# Patient Record
Sex: MALE | Race: OTHER | HISPANIC OR LATINO | Employment: OTHER | URBAN - METROPOLITAN AREA
[De-identification: names, ages, dates, MRNs, and addresses within clinical notes are randomized per-mention and may not be internally consistent; named-entity substitution may affect disease eponyms.]

---

## 2017-01-24 ENCOUNTER — HOSPITAL ENCOUNTER (EMERGENCY)
Facility: HOSPITAL | Age: 27
Discharge: HOME | End: 2017-01-24
Attending: EMERGENCY MEDICINE | Admitting: EMERGENCY MEDICINE
Payer: COMMERCIAL

## 2017-01-24 VITALS
RESPIRATION RATE: 20 BRPM | DIASTOLIC BLOOD PRESSURE: 85 MMHG | BODY MASS INDEX: 21.85 KG/M2 | OXYGEN SATURATION: 100 % | TEMPERATURE: 96.6 F | HEART RATE: 94 BPM | WEIGHT: 128 LBS | HEIGHT: 64 IN | SYSTOLIC BLOOD PRESSURE: 142 MMHG

## 2017-01-24 DIAGNOSIS — S30.21XA: Primary | ICD-10-CM

## 2017-01-24 PROCEDURE — 99283 EMERGENCY DEPT VISIT LOW MDM: CPT

## 2017-01-24 PROCEDURE — 87591 N.GONORRHOEAE DNA AMP PROB: CPT | Performed by: EMERGENCY MEDICINE

## 2017-01-24 PROCEDURE — 87491 CHLMYD TRACH DNA AMP PROBE: CPT | Performed by: EMERGENCY MEDICINE

## 2017-01-25 LAB
CHLAMYDIA DNA CVX QL NAA+PROBE: NORMAL
N GONORRHOEA DNA GENITAL QL NAA+PROBE: NORMAL

## 2017-04-02 ENCOUNTER — HOSPITAL ENCOUNTER (INPATIENT)
Facility: HOSPITAL | Age: 27
LOS: 1 days | Discharge: HOME | DRG: 812 | End: 2017-04-04
Attending: EMERGENCY MEDICINE | Admitting: INTERNAL MEDICINE
Payer: COMMERCIAL

## 2017-04-02 DIAGNOSIS — T50.901A OVERDOSE: Primary | ICD-10-CM

## 2017-04-02 PROCEDURE — 96374 THER/PROPH/DIAG INJ IV PUSH: CPT

## 2017-04-02 PROCEDURE — 93005 ELECTROCARDIOGRAM TRACING: CPT | Performed by: EMERGENCY MEDICINE

## 2017-04-02 RX ORDER — MIDAZOLAM HYDROCHLORIDE 1 MG/ML
5 INJECTION INTRAMUSCULAR; INTRAVENOUS ONCE
Status: COMPLETED | OUTPATIENT
Start: 2017-04-03 | End: 2017-04-02

## 2017-04-02 RX ORDER — MIDAZOLAM HYDROCHLORIDE 1 MG/ML
INJECTION INTRAMUSCULAR; INTRAVENOUS
Status: COMPLETED
Start: 2017-04-02 | End: 2017-04-02

## 2017-04-02 RX ADMIN — MIDAZOLAM HYDROCHLORIDE 5 MG: 1 INJECTION INTRAMUSCULAR; INTRAVENOUS at 23:56

## 2017-04-02 RX ADMIN — MIDAZOLAM 5 MG: 1 INJECTION INTRAMUSCULAR; INTRAVENOUS at 23:56

## 2017-04-03 ENCOUNTER — APPOINTMENT (EMERGENCY)
Dept: CT IMAGING | Facility: HOSPITAL | Age: 27
DRG: 812 | End: 2017-04-03
Payer: COMMERCIAL

## 2017-04-03 PROBLEM — T14.91XA SUICIDAL BEHAVIOR WITH ATTEMPTED SELF-INJURY (HCC): Status: ACTIVE | Noted: 2017-04-03

## 2017-04-03 PROBLEM — F10.929 ALCOHOL INTOXICATION (HCC): Status: ACTIVE | Noted: 2017-04-03

## 2017-04-03 PROBLEM — T50.901A OVERDOSE: Status: ACTIVE | Noted: 2017-04-03

## 2017-04-03 LAB
ALBUMIN SERPL BCP-MCNC: 3.3 G/DL (ref 3.5–5)
ALBUMIN SERPL BCP-MCNC: 4 G/DL (ref 3.5–5)
ALP SERPL-CCNC: 53 U/L (ref 46–116)
ALP SERPL-CCNC: 65 U/L (ref 46–116)
ALT SERPL W P-5'-P-CCNC: 35 U/L (ref 12–78)
ALT SERPL W P-5'-P-CCNC: 41 U/L (ref 12–78)
AMMONIA PLAS-SCNC: 25 UMOL/L (ref 11–35)
AMPHETAMINES SERPL QL SCN: NEGATIVE
ANION GAP SERPL CALCULATED.3IONS-SCNC: 11 MMOL/L (ref 4–13)
ANION GAP SERPL CALCULATED.3IONS-SCNC: 11 MMOL/L (ref 4–13)
APAP SERPL-MCNC: <2 UG/ML (ref 10–30)
APTT PPP: 28 SECONDS (ref 24–36)
AST SERPL W P-5'-P-CCNC: 31 U/L (ref 5–45)
AST SERPL W P-5'-P-CCNC: 40 U/L (ref 5–45)
ATRIAL RATE: 97 BPM
BARBITURATES UR QL: NEGATIVE
BASOPHILS # BLD MANUAL: 0 THOUSAND/UL (ref 0–0.1)
BASOPHILS NFR MAR MANUAL: 0 % (ref 0–1)
BENZODIAZ UR QL: NEGATIVE
BILIRUB SERPL-MCNC: 0.3 MG/DL (ref 0.2–1)
BILIRUB SERPL-MCNC: 0.4 MG/DL (ref 0.2–1)
BUN SERPL-MCNC: 7 MG/DL (ref 5–25)
BUN SERPL-MCNC: 7 MG/DL (ref 5–25)
CALCIUM SERPL-MCNC: 7.4 MG/DL (ref 8.3–10.1)
CALCIUM SERPL-MCNC: 8.4 MG/DL (ref 8.3–10.1)
CHLORIDE SERPL-SCNC: 107 MMOL/L (ref 100–108)
CHLORIDE SERPL-SCNC: 99 MMOL/L (ref 100–108)
CO2 SERPL-SCNC: 24 MMOL/L (ref 21–32)
CO2 SERPL-SCNC: 27 MMOL/L (ref 21–32)
COCAINE UR QL: NEGATIVE
CREAT SERPL-MCNC: 0.7 MG/DL (ref 0.6–1.3)
CREAT SERPL-MCNC: 0.7 MG/DL (ref 0.6–1.3)
EOSINOPHIL # BLD MANUAL: 0.17 THOUSAND/UL (ref 0–0.4)
EOSINOPHIL NFR BLD MANUAL: 3 % (ref 0–6)
ERYTHROCYTE [DISTWIDTH] IN BLOOD BY AUTOMATED COUNT: 11.5 % (ref 11.6–15.1)
ETHANOL SERPL-MCNC: 291 MG/DL (ref 0–3)
GFR SERPL CREATININE-BSD FRML MDRD: >60 ML/MIN/1.73SQ M
GFR SERPL CREATININE-BSD FRML MDRD: >60 ML/MIN/1.73SQ M
GLUCOSE SERPL-MCNC: 101 MG/DL (ref 65–140)
GLUCOSE SERPL-MCNC: 101 MG/DL (ref 65–140)
HCT VFR BLD AUTO: 42.3 % (ref 36.5–49.3)
HGB BLD-MCNC: 14.8 G/DL (ref 12–17)
INR PPP: 0.83 (ref 0.86–1.16)
LYMPHOCYTES # BLD AUTO: 1.86 THOUSAND/UL (ref 0.6–4.47)
LYMPHOCYTES # BLD AUTO: 33 % (ref 14–44)
MAGNESIUM SERPL-MCNC: 2 MG/DL (ref 1.6–2.6)
MCH RBC QN AUTO: 33 PG (ref 26.8–34.3)
MCHC RBC AUTO-ENTMCNC: 35 G/DL (ref 31.4–37.4)
MCV RBC AUTO: 94 FL (ref 82–98)
METHADONE UR QL: NEGATIVE
MONOCYTES # BLD AUTO: 0.62 THOUSAND/UL (ref 0–1.22)
MONOCYTES NFR BLD: 11 % (ref 4–12)
NEUTROPHILS # BLD MANUAL: 2.87 THOUSAND/UL (ref 1.85–7.62)
NEUTS SEG NFR BLD AUTO: 51 % (ref 43–75)
NRBC BLD AUTO-RTO: 1 /100 WBC (ref 0–2)
OPIATES UR QL SCN: NEGATIVE
P AXIS: 18 DEGREES
PCP UR QL: NEGATIVE
PLATELET # BLD AUTO: 209 THOUSANDS/UL (ref 149–390)
PLATELET # BLD AUTO: 237 THOUSANDS/UL (ref 149–390)
PLATELET BLD QL SMEAR: ADEQUATE
PMV BLD AUTO: 9.2 FL (ref 8.9–12.7)
PMV BLD AUTO: 9.3 FL (ref 8.9–12.7)
POTASSIUM SERPL-SCNC: 3.7 MMOL/L (ref 3.5–5.3)
POTASSIUM SERPL-SCNC: 4.5 MMOL/L (ref 3.5–5.3)
PR INTERVAL: 138 MS
PROT SERPL-MCNC: 6.8 G/DL (ref 6.4–8.2)
PROT SERPL-MCNC: 8.1 G/DL (ref 6.4–8.2)
PROTHROMBIN TIME: 11.4 SECONDS (ref 12–14.3)
QRS AXIS: 58 DEGREES
QRSD INTERVAL: 94 MS
QT INTERVAL: 340 MS
QTC INTERVAL: 431 MS
RBC # BLD AUTO: 4.48 MILLION/UL (ref 3.88–5.62)
SALICYLATES SERPL-MCNC: <3 MG/DL (ref 3–20)
SODIUM SERPL-SCNC: 137 MMOL/L (ref 136–145)
SODIUM SERPL-SCNC: 142 MMOL/L (ref 136–145)
T WAVE AXIS: 33 DEGREES
THC UR QL: NEGATIVE
TOTAL CELLS COUNTED SPEC: 100
VARIANT LYMPHS # BLD AUTO: 2 %
VENTRICULAR RATE: 97 BPM
WBC # BLD AUTO: 5.63 THOUSAND/UL (ref 4.31–10.16)

## 2017-04-03 PROCEDURE — 82140 ASSAY OF AMMONIA: CPT | Performed by: EMERGENCY MEDICINE

## 2017-04-03 PROCEDURE — 80329 ANALGESICS NON-OPIOID 1 OR 2: CPT | Performed by: EMERGENCY MEDICINE

## 2017-04-03 PROCEDURE — 80307 DRUG TEST PRSMV CHEM ANLYZR: CPT | Performed by: EMERGENCY MEDICINE

## 2017-04-03 PROCEDURE — 83735 ASSAY OF MAGNESIUM: CPT | Performed by: NURSE PRACTITIONER

## 2017-04-03 PROCEDURE — 85027 COMPLETE CBC AUTOMATED: CPT | Performed by: EMERGENCY MEDICINE

## 2017-04-03 PROCEDURE — 70450 CT HEAD/BRAIN W/O DYE: CPT

## 2017-04-03 PROCEDURE — 85049 AUTOMATED PLATELET COUNT: CPT | Performed by: NURSE PRACTITIONER

## 2017-04-03 PROCEDURE — 85730 THROMBOPLASTIN TIME PARTIAL: CPT | Performed by: EMERGENCY MEDICINE

## 2017-04-03 PROCEDURE — 99285 EMERGENCY DEPT VISIT HI MDM: CPT

## 2017-04-03 PROCEDURE — 96375 TX/PRO/DX INJ NEW DRUG ADDON: CPT

## 2017-04-03 PROCEDURE — 80053 COMPREHEN METABOLIC PANEL: CPT | Performed by: EMERGENCY MEDICINE

## 2017-04-03 PROCEDURE — 36415 COLL VENOUS BLD VENIPUNCTURE: CPT | Performed by: EMERGENCY MEDICINE

## 2017-04-03 PROCEDURE — 80053 COMPREHEN METABOLIC PANEL: CPT | Performed by: NURSE PRACTITIONER

## 2017-04-03 PROCEDURE — 93005 ELECTROCARDIOGRAM TRACING: CPT | Performed by: NURSE PRACTITIONER

## 2017-04-03 PROCEDURE — 85610 PROTHROMBIN TIME: CPT | Performed by: EMERGENCY MEDICINE

## 2017-04-03 PROCEDURE — 85007 BL SMEAR W/DIFF WBC COUNT: CPT | Performed by: EMERGENCY MEDICINE

## 2017-04-03 PROCEDURE — 80320 DRUG SCREEN QUANTALCOHOLS: CPT | Performed by: EMERGENCY MEDICINE

## 2017-04-03 RX ORDER — MIDAZOLAM HYDROCHLORIDE 1 MG/ML
5 INJECTION INTRAMUSCULAR; INTRAVENOUS ONCE
Status: COMPLETED | OUTPATIENT
Start: 2017-04-03 | End: 2017-04-03

## 2017-04-03 RX ORDER — MIDAZOLAM HYDROCHLORIDE 1 MG/ML
INJECTION INTRAMUSCULAR; INTRAVENOUS
Status: COMPLETED
Start: 2017-04-03 | End: 2017-04-03

## 2017-04-03 RX ORDER — LORAZEPAM 2 MG/ML
2 INJECTION INTRAMUSCULAR EVERY 6 HOURS PRN
Status: DISCONTINUED | OUTPATIENT
Start: 2017-04-03 | End: 2017-04-04 | Stop reason: HOSPADM

## 2017-04-03 RX ORDER — LORAZEPAM 2 MG/ML
2 INJECTION INTRAMUSCULAR ONCE
Status: DISCONTINUED | OUTPATIENT
Start: 2017-04-03 | End: 2017-04-03

## 2017-04-03 RX ORDER — LORAZEPAM 2 MG/ML
INJECTION INTRAMUSCULAR
Status: COMPLETED
Start: 2017-04-03 | End: 2017-04-03

## 2017-04-03 RX ORDER — LORAZEPAM 2 MG/ML
1 INJECTION INTRAMUSCULAR ONCE
Status: COMPLETED | OUTPATIENT
Start: 2017-04-03 | End: 2017-04-03

## 2017-04-03 RX ORDER — FOLIC ACID 1 MG/1
1 TABLET ORAL DAILY
Status: DISCONTINUED | OUTPATIENT
Start: 2017-04-04 | End: 2017-04-04 | Stop reason: HOSPADM

## 2017-04-03 RX ORDER — SODIUM CHLORIDE 9 MG/ML
125 INJECTION, SOLUTION INTRAVENOUS CONTINUOUS
Status: DISCONTINUED | OUTPATIENT
Start: 2017-04-03 | End: 2017-04-04

## 2017-04-03 RX ORDER — CHLORDIAZEPOXIDE HYDROCHLORIDE 25 MG/1
25 CAPSULE, GELATIN COATED ORAL EVERY 8 HOURS SCHEDULED
Status: DISCONTINUED | OUTPATIENT
Start: 2017-04-03 | End: 2017-04-04 | Stop reason: HOSPADM

## 2017-04-03 RX ORDER — KETOROLAC TROMETHAMINE 30 MG/ML
15 INJECTION, SOLUTION INTRAMUSCULAR; INTRAVENOUS EVERY 6 HOURS PRN
Status: DISCONTINUED | OUTPATIENT
Start: 2017-04-03 | End: 2017-04-04 | Stop reason: HOSPADM

## 2017-04-03 RX ORDER — THIAMINE MONONITRATE (VIT B1) 100 MG
100 TABLET ORAL DAILY
Status: DISCONTINUED | OUTPATIENT
Start: 2017-04-04 | End: 2017-04-04 | Stop reason: HOSPADM

## 2017-04-03 RX ORDER — ONDANSETRON 2 MG/ML
4 INJECTION INTRAMUSCULAR; INTRAVENOUS EVERY 6 HOURS PRN
Status: DISCONTINUED | OUTPATIENT
Start: 2017-04-03 | End: 2017-04-04 | Stop reason: HOSPADM

## 2017-04-03 RX ADMIN — CHLORDIAZEPOXIDE HYDROCHLORIDE 25 MG: 25 CAPSULE ORAL at 22:20

## 2017-04-03 RX ADMIN — MIDAZOLAM HYDROCHLORIDE 5 MG: 1 INJECTION INTRAMUSCULAR; INTRAVENOUS at 02:59

## 2017-04-03 RX ADMIN — METOPROLOL TARTRATE 5 MG: 5 INJECTION INTRAVENOUS at 14:25

## 2017-04-03 RX ADMIN — LORAZEPAM 1 MG: 2 INJECTION INTRAMUSCULAR at 00:37

## 2017-04-03 RX ADMIN — SODIUM CHLORIDE 2000 ML: 0.9 INJECTION, SOLUTION INTRAVENOUS at 03:00

## 2017-04-03 RX ADMIN — METOPROLOL TARTRATE 5 MG: 5 INJECTION INTRAVENOUS at 03:00

## 2017-04-03 RX ADMIN — SODIUM CHLORIDE 125 ML/HR: 0.9 INJECTION, SOLUTION INTRAVENOUS at 23:39

## 2017-04-03 RX ADMIN — SODIUM CHLORIDE 125 ML/HR: 0.9 INJECTION, SOLUTION INTRAVENOUS at 08:00

## 2017-04-03 RX ADMIN — ENOXAPARIN SODIUM 40 MG: 40 INJECTION SUBCUTANEOUS at 09:46

## 2017-04-03 RX ADMIN — SODIUM CHLORIDE 125 ML/HR: 0.9 INJECTION, SOLUTION INTRAVENOUS at 15:51

## 2017-04-03 RX ADMIN — SODIUM CHLORIDE 125 ML/HR: 0.9 INJECTION, SOLUTION INTRAVENOUS at 05:00

## 2017-04-03 RX ADMIN — MIDAZOLAM 5 MG: 1 INJECTION INTRAMUSCULAR; INTRAVENOUS at 02:59

## 2017-04-03 RX ADMIN — CHLORDIAZEPOXIDE HYDROCHLORIDE 25 MG: 25 CAPSULE ORAL at 13:25

## 2017-04-03 RX ADMIN — METOPROLOL TARTRATE 5 MG: 5 INJECTION INTRAVENOUS at 20:41

## 2017-04-03 RX ADMIN — LORAZEPAM 2 MG: 2 INJECTION, SOLUTION INTRAMUSCULAR; INTRAVENOUS at 12:53

## 2017-04-03 RX ADMIN — LORAZEPAM 1 MG: 2 INJECTION, SOLUTION INTRAMUSCULAR; INTRAVENOUS at 00:37

## 2017-04-04 PROBLEM — T50.901A OVERDOSE: Status: RESOLVED | Noted: 2017-04-03 | Resolved: 2017-04-04

## 2017-04-04 PROBLEM — T14.91XA SUICIDAL BEHAVIOR WITH ATTEMPTED SELF-INJURY (HCC): Status: RESOLVED | Noted: 2017-04-03 | Resolved: 2017-04-04

## 2017-04-04 LAB
ALBUMIN SERPL BCP-MCNC: 3.2 G/DL (ref 3.5–5)
ALP SERPL-CCNC: 53 U/L (ref 46–116)
ALT SERPL W P-5'-P-CCNC: 31 U/L (ref 12–78)
ANION GAP SERPL CALCULATED.3IONS-SCNC: 8 MMOL/L (ref 4–13)
AST SERPL W P-5'-P-CCNC: 25 U/L (ref 5–45)
BASOPHILS # BLD AUTO: 0.01 THOUSANDS/ΜL (ref 0–0.1)
BASOPHILS NFR BLD AUTO: 0 % (ref 0–1)
BILIRUB SERPL-MCNC: 0.5 MG/DL (ref 0.2–1)
BUN SERPL-MCNC: 6 MG/DL (ref 5–25)
CALCIUM SERPL-MCNC: 8.6 MG/DL (ref 8.3–10.1)
CHLORIDE SERPL-SCNC: 106 MMOL/L (ref 100–108)
CO2 SERPL-SCNC: 25 MMOL/L (ref 21–32)
CREAT SERPL-MCNC: 0.67 MG/DL (ref 0.6–1.3)
EOSINOPHIL # BLD AUTO: 0.1 THOUSAND/ΜL (ref 0–0.61)
EOSINOPHIL NFR BLD AUTO: 2 % (ref 0–6)
ERYTHROCYTE [DISTWIDTH] IN BLOOD BY AUTOMATED COUNT: 11.7 % (ref 11.6–15.1)
GFR SERPL CREATININE-BSD FRML MDRD: >60 ML/MIN/1.73SQ M
GLUCOSE SERPL-MCNC: 110 MG/DL (ref 65–140)
HCT VFR BLD AUTO: 37.7 % (ref 36.5–49.3)
HGB BLD-MCNC: 13.1 G/DL (ref 12–17)
LYMPHOCYTES # BLD AUTO: 1.19 THOUSANDS/ΜL (ref 0.6–4.47)
LYMPHOCYTES NFR BLD AUTO: 22 % (ref 14–44)
MAGNESIUM SERPL-MCNC: 1.9 MG/DL (ref 1.6–2.6)
MCH RBC QN AUTO: 33.3 PG (ref 26.8–34.3)
MCHC RBC AUTO-ENTMCNC: 34.7 G/DL (ref 31.4–37.4)
MCV RBC AUTO: 96 FL (ref 82–98)
MONOCYTES # BLD AUTO: 0.55 THOUSAND/ΜL (ref 0.17–1.22)
MONOCYTES NFR BLD AUTO: 10 % (ref 4–12)
NEUTROPHILS # BLD AUTO: 3.46 THOUSANDS/ΜL (ref 1.85–7.62)
NEUTS SEG NFR BLD AUTO: 66 % (ref 43–75)
PLATELET # BLD AUTO: 193 THOUSANDS/UL (ref 149–390)
PMV BLD AUTO: 9.4 FL (ref 8.9–12.7)
POTASSIUM SERPL-SCNC: 3.9 MMOL/L (ref 3.5–5.3)
PROT SERPL-MCNC: 6.8 G/DL (ref 6.4–8.2)
RBC # BLD AUTO: 3.93 MILLION/UL (ref 3.88–5.62)
SODIUM SERPL-SCNC: 139 MMOL/L (ref 136–145)
WBC # BLD AUTO: 5.31 THOUSAND/UL (ref 4.31–10.16)

## 2017-04-04 PROCEDURE — 80053 COMPREHEN METABOLIC PANEL: CPT | Performed by: INTERNAL MEDICINE

## 2017-04-04 PROCEDURE — 85025 COMPLETE CBC W/AUTO DIFF WBC: CPT | Performed by: INTERNAL MEDICINE

## 2017-04-04 PROCEDURE — 83735 ASSAY OF MAGNESIUM: CPT | Performed by: INTERNAL MEDICINE

## 2017-04-04 RX ORDER — LANOLIN ALCOHOL/MO/W.PET/CERES
50 CREAM (GRAM) TOPICAL DAILY
Qty: 15 TABLET | Refills: 0 | Status: SHIPPED | OUTPATIENT
Start: 2017-04-04 | End: 2022-05-24

## 2017-04-04 RX ORDER — FOLIC ACID 1 MG/1
1 TABLET ORAL DAILY
Qty: 30 TABLET | Refills: 0 | Status: SHIPPED | OUTPATIENT
Start: 2017-04-04 | End: 2022-05-24

## 2017-04-04 RX ORDER — CHLORDIAZEPOXIDE HYDROCHLORIDE 10 MG/1
10 CAPSULE, GELATIN COATED ORAL 2 TIMES DAILY
Qty: 20 CAPSULE | Refills: 0
Start: 2017-04-04 | End: 2022-05-24

## 2017-04-04 RX ADMIN — CHLORDIAZEPOXIDE HYDROCHLORIDE 25 MG: 25 CAPSULE ORAL at 05:35

## 2017-04-04 RX ADMIN — Medication 100 MG: at 08:35

## 2017-04-04 RX ADMIN — FOLIC ACID 1 MG: 1 TABLET ORAL at 08:35

## 2017-04-04 RX ADMIN — MULTIPLE VITAMINS W/ MINERALS TAB 1 TABLET: TAB at 08:35

## 2017-04-04 RX ADMIN — SODIUM CHLORIDE 125 ML/HR: 0.9 INJECTION, SOLUTION INTRAVENOUS at 07:10

## 2017-04-04 RX ADMIN — ENOXAPARIN SODIUM 40 MG: 40 INJECTION SUBCUTANEOUS at 08:35

## 2017-04-07 LAB
ATRIAL RATE: 119 BPM
P AXIS: 61 DEGREES
PR INTERVAL: 144 MS
QRS AXIS: 56 DEGREES
QRSD INTERVAL: 88 MS
QT INTERVAL: 308 MS
QTC INTERVAL: 433 MS
T WAVE AXIS: 38 DEGREES
VENTRICULAR RATE: 119 BPM

## 2017-04-09 VITALS
HEART RATE: 119 BPM | BODY MASS INDEX: 22.18 KG/M2 | DIASTOLIC BLOOD PRESSURE: 80 MMHG | HEIGHT: 66 IN | WEIGHT: 138.01 LBS | SYSTOLIC BLOOD PRESSURE: 133 MMHG | OXYGEN SATURATION: 99 % | TEMPERATURE: 97.5 F | RESPIRATION RATE: 16 BRPM

## 2017-09-27 ENCOUNTER — TRANSCRIBE ORDERS (OUTPATIENT)
Dept: LAB | Facility: HOSPITAL | Age: 27
End: 2017-09-27

## 2017-09-27 ENCOUNTER — APPOINTMENT (OUTPATIENT)
Dept: LAB | Facility: HOSPITAL | Age: 27
End: 2017-09-27
Payer: COMMERCIAL

## 2017-09-27 DIAGNOSIS — Z31.41 VISIT FOR SEMEN ANALYSIS: Primary | ICD-10-CM

## 2017-09-27 DIAGNOSIS — Z31.41 VISIT FOR SEMEN ANALYSIS: ICD-10-CM

## 2017-09-27 PROCEDURE — 89320 SEMEN ANAL VOL/COUNT/MOT: CPT

## 2017-09-29 LAB
B ABORTUS AB SMN QL AGGL: ABNORMAL
COLLECTION DATE & TIME: ABNORMAL
FRUCTOSE [MASS/VOLUME] IN SEMEN: ABNORMAL MG/DL
LIQUEFACTION TIME SMN: 10 MIN
PH SMN: 8.1 [PH] (ref 7.2–8.6)
SEX ABSTIN DURATION TIME PATIENT: ABNORMAL D
SPECIMEN VOL SMN: 0.8 ML (ref 1–5)
SPERM # SMN: 7.2 MILLION/EJACULATION (ref 40–1000)
SPERM AMORPHOUS HEAD NFR SMN: 18 %
SPERM MORPH PNL SMN: 8
SPERM MOTILE SMN QL MICRO: 55 %
SPERM MOTILITY SCORE SMN AUTO: 3 (ref 2–4)
SPERM PROG NFR SMN: 3 % (ref 2–4)
SPERM SMN: 0 %
SPERM SMN: 10 %
SPERM SMN: 2 %
SPERM SMN: 26 % (ref 50–100)
SPERM SMN: 32 %
SPERM SMN: 5 %
SPERM SMN: 7 %
SPERM SMN: 74 % (ref 0–50)
SPERM SMN: 9 /ML (ref 20–999)
VISC SMN: 3 CP (ref 3–4)
WBC SMN QL: 3 "HPF"

## 2020-11-25 ENCOUNTER — OCCMED (OUTPATIENT)
Dept: URGENT CARE | Facility: CLINIC | Age: 30
End: 2020-11-25
Payer: OTHER MISCELLANEOUS

## 2020-11-25 ENCOUNTER — APPOINTMENT (OUTPATIENT)
Dept: RADIOLOGY | Facility: CLINIC | Age: 30
End: 2020-11-25
Payer: OTHER MISCELLANEOUS

## 2020-11-25 DIAGNOSIS — R52 PAIN: Primary | ICD-10-CM

## 2020-11-25 DIAGNOSIS — R52 PAIN: ICD-10-CM

## 2020-11-25 PROCEDURE — 99283 EMERGENCY DEPT VISIT LOW MDM: CPT

## 2020-11-25 PROCEDURE — G0382 LEV 3 HOSP TYPE B ED VISIT: HCPCS

## 2020-11-25 PROCEDURE — 73630 X-RAY EXAM OF FOOT: CPT

## 2020-11-25 PROCEDURE — 73610 X-RAY EXAM OF ANKLE: CPT

## 2020-11-27 ENCOUNTER — OCCMED (OUTPATIENT)
Dept: URGENT CARE | Facility: CLINIC | Age: 30
End: 2020-11-27
Payer: OTHER MISCELLANEOUS

## 2020-11-27 PROCEDURE — 99213 OFFICE O/P EST LOW 20 MIN: CPT | Performed by: FAMILY MEDICINE

## 2020-12-11 ENCOUNTER — APPOINTMENT (OUTPATIENT)
Dept: URGENT CARE | Facility: CLINIC | Age: 30
End: 2020-12-11
Payer: OTHER MISCELLANEOUS

## 2020-12-11 PROCEDURE — 99213 OFFICE O/P EST LOW 20 MIN: CPT | Performed by: FAMILY MEDICINE

## 2021-07-30 ENCOUNTER — APPOINTMENT (OUTPATIENT)
Dept: RADIOLOGY | Facility: CLINIC | Age: 31
End: 2021-07-30
Payer: COMMERCIAL

## 2021-07-30 ENCOUNTER — OFFICE VISIT (OUTPATIENT)
Dept: URGENT CARE | Facility: CLINIC | Age: 31
End: 2021-07-30
Payer: COMMERCIAL

## 2021-07-30 VITALS
TEMPERATURE: 98 F | BODY MASS INDEX: 24.27 KG/M2 | OXYGEN SATURATION: 98 % | SYSTOLIC BLOOD PRESSURE: 122 MMHG | WEIGHT: 137 LBS | DIASTOLIC BLOOD PRESSURE: 74 MMHG | HEIGHT: 63 IN | RESPIRATION RATE: 18 BRPM | HEART RATE: 96 BPM

## 2021-07-30 DIAGNOSIS — S99.912A LEFT ANKLE INJURY, INITIAL ENCOUNTER: ICD-10-CM

## 2021-07-30 DIAGNOSIS — S93.492A SPRAIN OF ANTERIOR TALOFIBULAR LIGAMENT OF LEFT ANKLE, INITIAL ENCOUNTER: Primary | ICD-10-CM

## 2021-07-30 PROCEDURE — S9083 URGENT CARE CENTER GLOBAL: HCPCS | Performed by: FAMILY MEDICINE

## 2021-07-30 PROCEDURE — 73610 X-RAY EXAM OF ANKLE: CPT

## 2021-07-30 PROCEDURE — G0382 LEV 3 HOSP TYPE B ED VISIT: HCPCS | Performed by: FAMILY MEDICINE

## 2021-07-30 RX ORDER — MELOXICAM 7.5 MG/1
7.5 TABLET ORAL DAILY
Qty: 60 TABLET | Refills: 0 | Status: SHIPPED | OUTPATIENT
Start: 2021-07-30 | End: 2022-05-24

## 2021-07-30 NOTE — PROGRESS NOTES
[address] [employer]        NAME: Morrell Libman is a 27 y o  male  : 1990    MRN: [id number]  DATE: 2021  TIME: 10:44 AM    Assessment and Plan   Sprain of anterior talofibular ligament of left ankle, initial encounter [S93 492A]  1  Sprain of anterior talofibular ligament of left ankle, initial encounter  meloxicam (MOBIC) 7 5 mg tablet    Ambulatory referral to Physical Therapy   2  Left ankle injury, initial encounter  XR ankle 3+ vw left    meloxicam (MOBIC) 7 5 mg tablet    Ambulatory referral to Physical Therapy         Patient Instructions       Follow up with PCP in 3-5 days  Proceed to  ER if symptoms worsen  Chief Complaint     Chief Complaint   Patient presents with    Ankle Injury     left  Twisted ankle 5 days ago while walking and talking on phone         History of Present Illness       21-year-old male reports dressing his left ankle 5 days ago while walking in his home  He is now having pain and swelling over the lateral aspect of the ankle  Review of Systems   Review of Systems   Constitutional: Negative  HENT: Negative  Eyes: Negative  Respiratory: Negative  Cardiovascular: Negative  Gastrointestinal: Negative  Genitourinary: Negative  Musculoskeletal: Positive for arthralgias and myalgias  Skin: Negative  Allergic/Immunologic: Negative  Neurological: Negative  Hematological: Negative  Psychiatric/Behavioral: Negative            Current Medications       Current Outpatient Medications:     chlordiazePOXIDE (LIBRIUM) 10 mg capsule, Take 1 capsule by mouth 2 (two) times a day for 10 days Take for 3 days and than 10 mg daily for 3 days and than stop, Disp: 20 capsule, Rfl: 0    folic acid (FOLVITE) 1 mg tablet, Take 1 tablet by mouth daily for 30 days, Disp: 30 tablet, Rfl: 0    meloxicam (MOBIC) 7 5 mg tablet, Take 1 tablet (7 5 mg total) by mouth daily, Disp: 60 tablet, Rfl: 0    thiamine 100 MG tablet, Take 0 5 tablets by mouth daily for 30 days, Disp: 15 tablet, Rfl: 0    Current Allergies     Allergies as of 07/30/2021    (No Known Allergies)            The following portions of the patient's history were reviewed and updated as appropriate: allergies, current medications, past family history, past medical history, past social history, past surgical history and problem list      No past medical history on file  No past surgical history on file  No family history on file  Medications have been verified  Objective   /74   Pulse 96   Temp 98 °F (36 7 °C)   Resp 18   Ht 5' 3" (1 6 m)   Wt 62 1 kg (137 lb)   SpO2 98%   BMI 24 27 kg/m²   No LMP for male patient  Physical Exam     Physical Exam  Constitutional:       Appearance: He is well-developed  HENT:      Head: Normocephalic  Eyes:      Pupils: Pupils are equal, round, and reactive to light  Pulmonary:      Effort: Pulmonary effort is normal    Musculoskeletal:      Cervical back: Normal range of motion  Left ankle: Swelling present  Tenderness present over the ATF ligament  Decreased range of motion  Skin:     General: Skin is warm  Neurological:      Mental Status: He is alert and oriented to person, place, and time

## 2021-08-12 ENCOUNTER — EVALUATION (OUTPATIENT)
Dept: PHYSICAL THERAPY | Facility: CLINIC | Age: 31
End: 2021-08-12
Payer: COMMERCIAL

## 2021-08-12 DIAGNOSIS — S99.912A LEFT ANKLE INJURY, INITIAL ENCOUNTER: ICD-10-CM

## 2021-08-12 DIAGNOSIS — S93.492A SPRAIN OF ANTERIOR TALOFIBULAR LIGAMENT OF LEFT ANKLE, INITIAL ENCOUNTER: ICD-10-CM

## 2021-08-12 PROCEDURE — 97161 PT EVAL LOW COMPLEX 20 MIN: CPT | Performed by: PHYSICAL THERAPIST

## 2021-08-12 NOTE — LETTER
2021    Michael Gonzales, [address] [facility]  [address] [facility] [place] [address]    Patient: Shannan Navarrete   YOB: 1990   Date of Visit: 2021     Encounter Diagnosis     ICD-10-CM    1  Left ankle injury, initial encounter  S99 912A Ambulatory referral to Physical Therapy     PT plan of care cert/re-cert   2  Sprain of anterior talofibular ligament of left ankle, initial encounter  S93 492A Ambulatory referral to Physical Therapy     PT plan of care cert/re-cert       Dear Dr Jackie Tamayo: Thank you for your recent referral of Shannan Navarrete  Please review the attached evaluation summary from Corbin's recent visit  Please verify that you agree with the plan of care by signing the attached order  If you have any questions or concerns, please do not hesitate to call  I sincerely appreciate the opportunity to share in the care of one of your patients and hope to have another opportunity to work with you in the near future  Sincerely,    Joy Elizabeth, PT      Referring Provider:      I certify that I have read the below Plan of Care and certify the need for these services furnished under this plan of treatment while under my care  DO BENNY Milner G  [address]  [address] [facility] [place] [address]  Via Fax: [phone]          PT Evaluation     Today's date: 2021  Patient name: Shannan Navarrete  : 1990  MRN: [id number]  Referring provider: Jeffery Kay DO  Dx:   Encounter Diagnosis     ICD-10-CM    1  Left ankle injury, initial encounter  25 [id number] Ambulatory referral to Physical Therapy   2  Sprain of anterior talofibular ligament of left ankle, initial encounter  S93 492A Ambulatory referral to Physical Therapy                  Assessment  Assessment details: Patient is a 27 y o  male with PT prescription for left ATFL sprain   Patient presents with decreased ankle dorsiflexion and plantarflexion ROM, decreased joint mobility  His strength, swelling, and ankle stability in single limb stance are symmetrical to his uninvolved side  Patient gets mild pain with prolonged standing and walking, but hasn't had functional limitation recently  Due to low levels of pain and few noted impairments, patient likely is appropriate for just home program versus formal physical therapy  Patient was instructed in home program to address deficits  Instructed patient to follow up with clinic if he starts to have significant pain again or has questions about home program  Patient is in agreement with this plan  Impairments: abnormal or restricted ROM, lacks appropriate home exercise program and weight-bearing intolerance    Symptom irritability: lowUnderstanding of Dx/Px/POC: good   Prognosis: good    Goals  Short term goals:  Patient is independent in home program to support plan of care and improve function  - 2 weeks  Patient demonstrates 0 degrees of ankle dorsiflexion PROM so he can climb stairs pain  - 2 weeks  Patient can walk in home without pain  - 2 weeks    Long term goals:  Patient demonstrates improvement in community participation with increase in FOTO score by 10%  - 4 weeks  Patient can return to work duties without pain  - 4 weeks  Patient demonstrates full dorsiflexion AROM so he can walk in community and do food shopping without pain   4 weeks      Plan  Patient would benefit from: skilled physical therapy  Planned therapy interventions: ADL training, balance, balance/weight bearing training, coordination, flexibility, functional ROM exercises, gait training, home exercise program, therapeutic exercise, therapeutic activities, strengthening, stretching, joint mobilization, manual therapy, massage, patient education and neuromuscular re-education  Frequency: 1x week  Duration in weeks: 4  Plan of Care beginning date: 8/12/2021  Plan of Care expiration date: 9/9/2021        Subjective Evaluation    History of Present Illness  Date of onset: 2021  Mechanism of injury: Patient has PT prescription for sprain of left anterior talofibular ligament  Patient reports onset of symptoms about 2 weeks ago when he sprained ankle when stepping in potthole  He reports gradual improvement in symptoms over the past week but initially had to walk with crutches  Symptoms: Patient gets mild pain with walking  He hasn't tried steps yet  Patient works as  but hasn't been limited with work since going back  He was getting severe pain and swelling but it has improved  Pain  Current pain ratin  At best pain ratin  At worst pain ratin    Treatments  Current treatment: medication  Patient Goals  Patient goals for therapy: decreased pain          Objective     Active Range of Motion   Left Ankle/Foot   Dorsiflexion (ke): -4 degrees   Plantar flexion: 70 degrees   Inversion: 40 degrees   Eversion: 15 degrees     Right Ankle/Foot   Dorsiflexion (ke): 0 degrees   Plantar flexion: 75 degrees   Inversion: 52 degrees   Eversion: 15 degrees     Passive Range of Motion   Left Ankle/Foot    Dorsiflexion (ke): 0 degrees   Plantar flexion: WFL  Inversion: 45 degrees with pain  Eversion: 20 degrees     Right Ankle/Foot    Dorsiflexion (ke): 4 degrees   Plantar flexion: WFL  Inversion: WFL  Eversion: 20 degrees     Joint Play   Left Ankle/Foot  Hypomobile in the talocrural joint  Strength/Myotome Testing     Left Hip   Planes of Motion   Flexion: 5  Abduction: 5    Right Hip   Planes of Motion   Flexion: 5  Abduction: 5    Left Knee   Flexion: 5  Extension: 5    Right Knee   Flexion: 5  Extension: 5    Left Ankle/Foot   Dorsiflexion: 5  Plantar flexion: 4+  Inversion: 5  Eversion: 5    Right Ankle/Foot   Dorsiflexion: 5  Plantar flexion: 4+  Inversion: 5  Eversion: 5    Tests   Left Ankle/Foot   Negative for anterior drawer       Additional Tests Details  SLS 30 seconds eyes open b/l, 20 seconds eyes closed b/l    Swelling   Left Ankle/Foot   Figure 8: 49 cm    Right Ankle/Foot   Figure 8: 49 cm             Precautions: none      Manuals                                                                 Neuro Re-Ed             SLS                                                                                           Ther Ex             Knee to wall stretch             Gastro stretch                                                                                           Ther Activity                                       Gait Training                                       Modalities

## 2021-08-12 NOTE — PROGRESS NOTES
PT Evaluation     Today's date: 2021  Patient name: Sonny Armenta  : 1990  MRN: [id number]  Referring provider: Tatiana Lord DO  Dx:   Encounter Diagnosis     ICD-10-CM    1  Left ankle injury, initial encounter  [id number] Ambulatory referral to Physical Therapy   2  Sprain of anterior talofibular ligament of left ankle, initial encounter  S93 492A Ambulatory referral to Physical Therapy                  Assessment  Assessment details: Patient is a 27 y o  male with PT prescription for left ATFL sprain  Patient presents with decreased ankle dorsiflexion and plantarflexion ROM, decreased joint mobility  His strength, swelling, and ankle stability in single limb stance are symmetrical to his uninvolved side  Patient gets mild pain with prolonged standing and walking, but hasn't had functional limitation recently  Due to low levels of pain and few noted impairments, patient likely is appropriate for just home program versus formal physical therapy  Patient was instructed in home program to address deficits  Instructed patient to follow up with clinic if he starts to have significant pain again or has questions about home program  Patient is in agreement with this plan  Impairments: abnormal or restricted ROM, lacks appropriate home exercise program and weight-bearing intolerance    Symptom irritability: lowUnderstanding of Dx/Px/POC: good   Prognosis: good    Goals  Short term goals:  Patient is independent in home program to support plan of care and improve function  - 2 weeks  Patient demonstrates 0 degrees of ankle dorsiflexion PROM so he can climb stairs pain  - 2 weeks  Patient can walk in home without pain  - 2 weeks    Long term goals:  Patient demonstrates improvement in community participation with increase in FOTO score by 10%  - 4 weeks  Patient can return to work duties without pain   - 4 weeks  Patient demonstrates full dorsiflexion AROM so he can walk in community and do food shopping without pain  4 weeks      Plan  Patient would benefit from: skilled physical therapy  Planned therapy interventions: ADL training, balance, balance/weight bearing training, coordination, flexibility, functional ROM exercises, gait training, home exercise program, therapeutic exercise, therapeutic activities, strengthening, stretching, joint mobilization, manual therapy, massage, patient education and neuromuscular re-education  Frequency: 1x week  Duration in weeks: 4  Plan of Care beginning date: 2021  Plan of Care expiration date: 2021        Subjective Evaluation    History of Present Illness  Date of onset: 2021  Mechanism of injury: Patient has PT prescription for sprain of left anterior talofibular ligament  Patient reports onset of symptoms about 2 weeks ago when he sprained ankle when stepping in potthole  He reports gradual improvement in symptoms over the past week but initially had to walk with crutches  Symptoms: Patient gets mild pain with walking  He hasn't tried steps yet  Patient works as  but hasn't been limited with work since going back  He was getting severe pain and swelling but it has improved    Pain  Current pain ratin  At best pain ratin  At worst pain ratin    Treatments  Current treatment: medication  Patient Goals  Patient goals for therapy: decreased pain          Objective     Active Range of Motion   Left Ankle/Foot   Dorsiflexion (ke): -4 degrees   Plantar flexion: 70 degrees   Inversion: 40 degrees   Eversion: 15 degrees     Right Ankle/Foot   Dorsiflexion (ke): 0 degrees   Plantar flexion: 75 degrees   Inversion: 52 degrees   Eversion: 15 degrees     Passive Range of Motion   Left Ankle/Foot    Dorsiflexion (ke): 0 degrees   Plantar flexion: WFL  Inversion: 45 degrees with pain  Eversion: 20 degrees     Right Ankle/Foot    Dorsiflexion (ke): 4 degrees   Plantar flexion: WFL  Inversion: WFL  Eversion: 20 degrees     Joint Play Left Ankle/Foot  Hypomobile in the talocrural joint  Strength/Myotome Testing     Left Hip   Planes of Motion   Flexion: 5  Abduction: 5    Right Hip   Planes of Motion   Flexion: 5  Abduction: 5    Left Knee   Flexion: 5  Extension: 5    Right Knee   Flexion: 5  Extension: 5    Left Ankle/Foot   Dorsiflexion: 5  Plantar flexion: 4+  Inversion: 5  Eversion: 5    Right Ankle/Foot   Dorsiflexion: 5  Plantar flexion: 4+  Inversion: 5  Eversion: 5    Tests   Left Ankle/Foot   Negative for anterior drawer       Additional Tests Details  SLS 30 seconds eyes open b/l, 20 seconds eyes closed b/l    Swelling   Left Ankle/Foot   Figure 8: 49 cm    Right Ankle/Foot   Figure 8: 49 cm             Precautions: none      Manuals                                                                 Neuro Re-Ed             SLS                                                                                           Ther Ex             Knee to wall stretch             Gastro stretch                                                                                           Ther Activity                                       Gait Training                                       Modalities

## 2022-05-24 ENCOUNTER — HOSPITAL ENCOUNTER (EMERGENCY)
Facility: HOSPITAL | Age: 32
Discharge: HOME | End: 2022-05-24
Attending: EMERGENCY MEDICINE | Admitting: EMERGENCY MEDICINE
Payer: COMMERCIAL

## 2022-05-24 VITALS
BODY MASS INDEX: 22.2 KG/M2 | RESPIRATION RATE: 16 BRPM | HEIGHT: 64 IN | SYSTOLIC BLOOD PRESSURE: 128 MMHG | HEART RATE: 84 BPM | TEMPERATURE: 98.6 F | WEIGHT: 130 LBS | OXYGEN SATURATION: 98 % | DIASTOLIC BLOOD PRESSURE: 78 MMHG

## 2022-05-24 DIAGNOSIS — R45.851 SUICIDAL IDEATION: Primary | ICD-10-CM

## 2022-05-24 DIAGNOSIS — F10.929 ALCOHOL INTOXICATION (HCC): ICD-10-CM

## 2022-05-24 LAB
AMPHETAMINES SERPL QL SCN: NEGATIVE
BARBITURATES UR QL: NEGATIVE
BENZODIAZ UR QL: NEGATIVE
COCAINE UR QL: NEGATIVE
ETHANOL EXG-MCNC: 0.07 MG/DL
ETHANOL EXG-MCNC: 0.1 MG/DL
ETHANOL EXG-MCNC: 0.1 MG/DL
ETHANOL EXG-MCNC: 0.15 MG/DL
ETHANOL EXG-MCNC: 0.23 MG/DL
FLUAV RNA RESP QL NAA+PROBE: NEGATIVE
FLUBV RNA RESP QL NAA+PROBE: NEGATIVE
METHADONE UR QL: NEGATIVE
OPIATES UR QL SCN: NEGATIVE
OXYCODONE+OXYMORPHONE UR QL SCN: NEGATIVE
PCP UR QL: NEGATIVE
RSV RNA RESP QL NAA+PROBE: NEGATIVE
SARS-COV-2 RNA RESP QL NAA+PROBE: NEGATIVE
THC UR QL: NEGATIVE

## 2022-05-24 PROCEDURE — 99285 EMERGENCY DEPT VISIT HI MDM: CPT | Performed by: EMERGENCY MEDICINE

## 2022-05-24 PROCEDURE — 80307 DRUG TEST PRSMV CHEM ANLYZR: CPT | Performed by: EMERGENCY MEDICINE

## 2022-05-24 PROCEDURE — 93005 ELECTROCARDIOGRAM TRACING: CPT

## 2022-05-24 PROCEDURE — 99285 EMERGENCY DEPT VISIT HI MDM: CPT

## 2022-05-24 PROCEDURE — 82075 ASSAY OF BREATH ETHANOL: CPT | Performed by: EMERGENCY MEDICINE

## 2022-05-24 PROCEDURE — 0241U HB NFCT DS VIR RESP RNA 4 TRGT: CPT | Performed by: EMERGENCY MEDICINE

## 2022-05-24 PROCEDURE — 82075 ASSAY OF BREATH ETHANOL: CPT

## 2022-05-24 RX ORDER — LORAZEPAM 1 MG/1
1 TABLET ORAL ONCE
Status: COMPLETED | OUTPATIENT
Start: 2022-05-24 | End: 2022-05-24

## 2022-05-24 RX ADMIN — LORAZEPAM 1 MG: 1 TABLET ORAL at 02:21

## 2022-05-24 NOTE — ED CARE HANDOFF
Emergency Department Sign Out Note        Sign out and transfer of care from Dr Romeo Harding  See Separate Emergency Department note  The patient, Isabel Ruiz, was evaluated by the previous provider for etoh intoxication and SI                                     ED Course as of 05/24/22 1601   Tue May 24, 2022   1358 Patient evaluated by crisis team  Patient denies SI/HI/AH/VH  He does admit to be depressed s/p disagreement with wife over children  He does have an outpatient counselor  He does not want inpatient psychiatric treatment  He is amenable to speaking with [facility] at this time  1600 Patient reports that he no longer wants to wait for be cares to evaluate him  He does not want detox or psychiatric help  He continues to deny suicidal ideations, homicidal ideations, auditory visual hallucinations  Patient is ready for discharge  He states he has a counselor with whom he will follow up with in the outpatient setting  Procedures  MDM        Disposition  Final diagnoses:   Suicidal ideation   Alcohol intoxication ([facility] [address] )     Time reflects when diagnosis was documented in both MDM as applicable and the Disposition within this note     Time User Action Codes Description Comment    5/24/2022  6:06 AM Lupie Areas Add [K59 388] Suicidal ideation     5/24/2022  6:06 AM Lupie Areas Add [F10 929] Alcohol intoxication [facility])       ED Disposition     ED Disposition   Discharge    Condition   Stable    Date/Time   Tue May 24, 2022  4:00 PM    [address] [place] discharge to home/self care                 Follow-up Information     Follow up With Specialties Details Why Contact Info [facility] [address] Emergency Department Emergency Medicine Go to  As needed, If symptoms worsen, for re-evaluation [address] [place],[address]D [address]  [address] [facility], [address] [facility] [address] [facility] , [address] [place] [address] [address]        Patient's Medications   Discharge Prescriptions    No medications on file     No discharge procedures on file         ED Provider  Electronically Signed by     Megan Sheffield DO  05/24/22 4934

## 2022-05-24 NOTE — ED NOTES
Per Dr Patricia Drake, patient is appropriate for virtual one to one as patient is not SI or HI at this time or for dayshift       Kadie Painting, KIRK  05/24/22 5580

## 2022-05-24 NOTE — DISCHARGE INSTRUCTIONS
This writer discussed the patients current presentation and recommended discharge plan with Dr Leonard Jaramillo  They agree with the patient being discharged at this time with referrals and/or information about psychoeducation on alcohol and depression  The patient was Instructed to follow up with their outpatient therapist  The patient was provided with referral information for:   n/a    The patient declined to complete a safety plan however a blank plan was provided for future use           National Suicide Prevention Hotline:  9-746.223.6126    [facility] [address] [facility] [phone] - LVF Crisis/Mobile Crisis   [address]: [phone]  [facility]: [address] [phone] - Crisis   [phone] - Peer Support Talk Line (1-9pm daily)  [phone] - Teen Support Talk Line (1-9pm daily)  [address] [place] [address] ([place]) [phone] - [address] [facility]

## 2022-05-24 NOTE — ED NOTES
Patient given belongings at this time   Provider reviewing discharge papers with patient     Holley Medel RN  05/24/22 0581

## 2022-05-24 NOTE — ED NOTES
Pending etoh level and crisis consult to re-evaluate behavior health continual observation order       Orville Moreno, KIRK  05/24/22 6064

## 2022-05-24 NOTE — ED NOTES
Pt reported that he has been having issues with ex girlfriend and custody of children  Pt reported that she will not let him see his children  Pt reported coping with this by drinking  Pt reported currently seeing a therapist of Alonso Chance in [facility]  Pt denies medical issues  Pt currently works at [employer] as a  full time  Pt reported no legal or nicotine use  Pt reported drinking heavily to cope with not being able to see the children  Pt reported no current suicidal or homicidal ideation  Pt reported no delusions or psychosis at this time  Pt provided with psycho education regarding depression and drinking  Pt receptive and receptive to [facility] evaluation

## 2022-05-24 NOTE — ED NOTES
Patient coming to nurses's station at this time requesting update  Patient made aware of [facility] handoff and arrival approximately between 4-5  Patient stating he has work this evening  Patient agreeable to wait at this time       Bobby Yuen RN  05/24/22 9919

## 2022-05-24 NOTE — ED NOTES
Patient denies SI or HI at this time  Patient stating that his significant other does not allow him to see his children, causing his SI symptoms       Cheyenne Pak RN  05/24/22 1388

## 2022-05-24 NOTE — ED PROVIDER NOTES
History  Chief Complaint   Patient presents with    Suicidal     States he was planning on crashing his car to end his life  States that he drank beer  Complaining of pain in head, stomach, and back     35 yo M presents to ED with gradually worsening depression and subsequent suicidal ideation  No attempt, though has in past  Plan was to crash his car this time, though he did not do that  No meds  No HI or hallucinations  He feels scared all the time  Sees a therapist  No h/o etoh withdrawal/seizures in past  Drinks 1x per week or so  Last drink a few hours ago  History provided by:  Patient and medical records   used: No    Psychiatric Evaluation  Presenting symptoms: depression and suicidal thoughts    Onset quality:  Gradual  Timing:  Constant  Progression:  Worsening  Chronicity:  Recurrent  Associated symptoms: no abdominal pain, no anxiety, no chest pain, no fatigue and no headaches        None       History reviewed  No pertinent past medical history  History reviewed  No pertinent surgical history  History reviewed  No pertinent family history  I have reviewed and agree with the history as documented  E-Cigarette/Vaping     E-Cigarette/Vaping Substances     Social History     Tobacco Use    Smoking status: Current Every Day Smoker     Types: Cigarettes    Smokeless tobacco: Never Used   Substance Use Topics    Alcohol use: Yes    Drug use: No       Review of Systems   Constitutional: Negative for chills, diaphoresis, fatigue, fever and unexpected weight change  HENT: Negative for congestion, ear pain, rhinorrhea, sore throat, trouble swallowing and voice change  Eyes: Negative for pain and visual disturbance  Respiratory: Negative for cough, chest tightness and shortness of breath  Cardiovascular: Negative for chest pain, palpitations and leg swelling     Gastrointestinal: Negative for abdominal pain, blood in stool, constipation, diarrhea, nausea and vomiting  Genitourinary: Negative for difficulty urinating and hematuria  Musculoskeletal: Negative for arthralgias, back pain and neck pain  Skin: Negative for rash  Neurological: Negative for dizziness, syncope, light-headedness and headaches  Psychiatric/Behavioral: Positive for suicidal ideas  Negative for confusion  The patient is not nervous/anxious  Physical Exam  Physical Exam  Vitals and nursing note reviewed  Constitutional:       General: He is not in acute distress  Appearance: He is well-developed  He is not diaphoretic  HENT:      Head: Normocephalic and atraumatic  Right Ear: External ear normal       Left Ear: External ear normal       Nose: Nose normal    Eyes:      General: No scleral icterus  Right eye: No discharge  Left eye: No discharge  Conjunctiva/sclera: Conjunctivae normal       Pupils: Pupils are equal, round, and reactive to light  Neck:      Vascular: No JVD  Trachea: No tracheal deviation  Cardiovascular:      Rate and Rhythm: Normal rate and regular rhythm  Heart sounds: Normal heart sounds  No murmur heard  No friction rub  No gallop  Pulmonary:      Effort: Pulmonary effort is normal  No respiratory distress  Breath sounds: Normal breath sounds  No stridor  No wheezing or rales  Chest:      Chest wall: No tenderness  Abdominal:      General: Bowel sounds are normal  There is no distension  Palpations: Abdomen is soft  Tenderness: There is no abdominal tenderness  There is no guarding or rebound  Musculoskeletal:         General: No tenderness or deformity  Normal range of motion  Cervical back: Normal range of motion and neck supple  Lymphadenopathy:      Cervical: No cervical adenopathy  Skin:     General: Skin is warm and dry  Findings: No rash  Neurological:      General: No focal deficit present  Mental Status: He is alert and oriented to person, place, and time  Cranial Nerves: No cranial nerve deficit  Sensory: No sensory deficit  Coordination: Coordination normal    Psychiatric:         Attention and Perception: Attention and perception normal          Mood and Affect: Mood is depressed  Affect is flat and tearful  Speech: Speech normal          Behavior: Behavior is withdrawn  Thought Content: Thought content is not paranoid  Thought content includes suicidal ideation  Thought content does not include homicidal ideation  Judgment: Judgment normal          Vital Signs  ED Triage Vitals [05/24/22 0056]   Temperature Pulse Respirations Blood Pressure SpO2   98 1 °F (36 7 °C) 95 16 133/85 99 %      Temp Source Heart Rate Source Patient Position - Orthostatic VS BP Location FiO2 (%)   Oral Monitor Lying Right arm --      Pain Score       --           Vitals:    05/24/22 0056   BP: 133/85   Pulse: 95   Patient Position - Orthostatic VS: Lying         Visual Acuity      ED Medications  Medications   LORazepam (ATIVAN) tablet 1 mg (1 mg Oral Given 5/24/22 0221)       Diagnostic Studies  Results Reviewed     Procedure Component Value Units Date/Time    POCT alcohol breath test [[id number]]     Lab Status: No result     COVID/FLU/RSV - 2 hour TAT [[id number]]  (Normal) Collected: 05/24/22 0126    Lab Status: Final result Specimen: Nares from Nasopharyngeal Swab Updated: 05/24/22 0210     SARS-CoV-2 Negative     INFLUENZA A PCR Negative     INFLUENZA B PCR Negative     RSV PCR Negative    Narrative:      FOR PEDIATRIC PATIENTS - copy/paste COVID Guidelines URL to browser: [email or web address]    SARS-CoV-2 assay is a Nucleic Acid Amplification assay intended for the  qualitative detection of nucleic acid from SARS-CoV-2 in nasopharyngeal  swabs  Results are for the presumptive identification of SARS-CoV-2 RNA      Positive results are indicative of infection with SARS-CoV-2, the virus  causing COVID-19, but do not rule out bacterial infection or co-infection  with other viruses  Laboratories within the [place] and its  territories are required to report all positive results to the appropriate  public health authorities  Negative results do not preclude SARS-CoV-2  infection and should not be used as the sole basis for treatment or other  patient management decisions  Negative results must be combined with  clinical observations, patient history, and epidemiological information  This test has not been FDA cleared or approved  This test has been authorized by FDA under an Emergency Use Authorization  (EUA)  This test is only authorized for the duration of time the  declaration that circumstances exist justifying the authorization of the  emergency use of an in vitro diagnostic tests for detection of SARS-CoV-2  virus and/or diagnosis of COVID-19 infection under section 564(b)(1) of  the Act, 21 U  S C  314CEK-5(P)(7), unless the authorization is terminated  or revoked sooner  The test has been validated but independent review by FDA  and CLIA is pending  Test performed using [employer]: This RT-PCR assay targets N2,  a region unique to SARS-CoV-2  A conserved region in the E-gene was chosen  for pan-Sarbecovirus detection which includes SARS-CoV-2  Rapid drug screen, urine [[id number]]  (Normal) Collected: 05/24/22 0126    Lab Status: Final result Specimen: Urine, Clean Catch Updated: 05/24/22 0142     Amph/Meth UR Negative     Barbiturate Ur Negative     Benzodiazepine Urine Negative     Cocaine Urine Negative     Methadone Urine Negative     Opiate Urine Negative     PCP Ur Negative     THC Urine Negative     Oxycodone Urine Negative    Narrative:      FOR MEDICAL PURPOSES ONLY  IF CONFIRMATION NEEDED PLEASE CONTACT THE LAB WITHIN 5 DAYS      Drug Screen Cutoff Levels:  AMPHETAMINE/METHAMPHETAMINES  1000 ng/mL  BARBITURATES     200 ng/mL  BENZODIAZEPINES     200 ng/mL  COCAINE      300 ng/mL  METHADONE      300 ng/mL  OPIATES      300 ng/mL  PHENCYCLIDINE     25 ng/mL  THC       50 ng/mL  OXYCODONE      100 ng/mL    POCT alcohol breath test [[id number]]  (Normal) Resulted: 05/24/22 0125    Lab Status: Final result Updated: 05/24/22 0125     EXTBreath Alcohol 0 228                 No orders to display              Procedures  ECG 12 Lead Documentation Only    Date/Time: 5/24/2022 2:11 AM  Performed by: Arthur Ch MD  Authorized by: Arthur Ch MD     Indications / Diagnosis:  Psych  ECG reviewed by me, the ED Provider: yes    Patient location:  ED  Previous ECG:     Previous ECG:  Compared to current    Similarity:  No change    Comparison to cardiac monitor: Yes    Interpretation:     Interpretation: normal    Rate:     ECG rate:  94    ECG rate assessment: normal    Rhythm:     Rhythm: sinus rhythm    Ectopy:     Ectopy: none    QRS:     QRS axis:  Normal    QRS intervals:  Normal  Conduction:     Conduction: normal    ST segments:     ST segments:  Normal  T waves:     T waves: normal               ED Course  ED Course as of 05/24/22 0606   Tue May 24, 2022   0605 Pt intoxicated, suicidal  Ivett Cheers and has a plan to kill himself by crashing his car  Would need crisis evaluation when sober  S/o to Dr Govind De La Cruz at end of shift  SBIRT 22yo+    Flowsheet Row Most Recent Value   SBIRT (23 yo +)    In order to provide better care to our patients, we are screening all of our patients for alcohol and drug use  Would it be okay to ask you these screening questions?  No Filed at: 05/24/2022 0111                    MDM  Number of Diagnoses or Management Options  Alcohol intoxication ([facility] [address] ): new and requires workup  Suicidal ideation: new and requires workup     Amount and/or Complexity of Data Reviewed  Clinical lab tests: ordered and reviewed  Review and summarize past medical records: yes  Discuss the patient with other providers: yes    Risk of Complications, Morbidity, and/or Mortality  Presenting problems: moderate  Diagnostic procedures: low  Management options: low    Patient Progress  Patient progress: stable      Disposition  Final diagnoses:   Suicidal ideation   Alcohol intoxication ([address] )     Time reflects when diagnosis was documented in both MDM as applicable and the Disposition within this note     Time User Action Codes Description Comment    5/24/2022  6:06 AM Cathleen Funes Add [M92 202] Suicidal ideation     5/24/2022  6:06 AM Cathleen Funes Add [F10 569] Alcohol intoxication [facility])       ED Disposition     None      Follow-up Information    None         Patient's Medications   Discharge Prescriptions    No medications on file       No discharge procedures on file      PDMP Review     None          ED Provider  Electronically Signed by           Pavel Cool MD  05/24/22 7514

## 2022-05-24 NOTE — ED NOTES
This writer discussed the patients current presentation and recommended discharge plan with Dr Binh Crowell  They agree with the patient being discharged at this time with referrals and/or information about psychoeducation on alcohol and depression  The patient was Instructed to follow up with their outpatient therapist  The patient was provided with referral information for:   n/a    The patient declined to complete a safety plan however a blank plan was provided for future use           National Suicide Prevention Hotline:  6-935.154.1600    [facility] [address] [phone] - LVF Crisis/Mobile Crisis   [address]: [phone]  [facility]: [address] [phone] - Crisis   [phone] - Peer Support Talk Line (1-9pm daily)  [phone] - Teen Support Talk Line (1-9pm daily)  [address] [place] [address] [place] ([place]) [phone] - [facility]

## 2022-05-24 NOTE — ED NOTES
[facility] called at this time, stating someone will be here for evaluation at approximately 4-5pm      Devante Maradiaga, [address] [place] [address]  05/24/22 2005

## 2022-05-24 NOTE — ED NOTES
Continual observation remaining in place  ED tech at bedside for patient safety  Patient POCT alcohol elevated       Amberlistjeffrey Shanks RN  05/24/22 0175

## 2022-05-27 LAB
ATRIAL RATE: 94 BPM
P AXIS: 62 DEGREES
PR INTERVAL: 150 MS
QRS AXIS: 59 DEGREES
QRSD INTERVAL: 102 MS
QT INTERVAL: 348 MS
QTC INTERVAL: 435 MS
T WAVE AXIS: 19 DEGREES
VENTRICULAR RATE: 94 BPM

## 2022-05-27 PROCEDURE — 93010 ELECTROCARDIOGRAM REPORT: CPT | Performed by: INTERNAL MEDICINE

## 2022-10-16 ENCOUNTER — HOSPITAL ENCOUNTER (EMERGENCY)
Facility: HOSPITAL | Age: 32
Discharge: HOME | End: 2022-10-16
Attending: EMERGENCY MEDICINE
Payer: COMMERCIAL

## 2022-10-16 ENCOUNTER — APPOINTMENT (EMERGENCY)
Dept: RADIOLOGY | Facility: HOSPITAL | Age: 32
End: 2022-10-16
Payer: COMMERCIAL

## 2022-10-16 VITALS
TEMPERATURE: 97.8 F | WEIGHT: 127 LBS | SYSTOLIC BLOOD PRESSURE: 124 MMHG | RESPIRATION RATE: 16 BRPM | HEIGHT: 63 IN | BODY MASS INDEX: 22.5 KG/M2 | HEART RATE: 100 BPM | OXYGEN SATURATION: 100 % | DIASTOLIC BLOOD PRESSURE: 82 MMHG

## 2022-10-16 DIAGNOSIS — L03.115 CELLULITIS OF RIGHT FOOT: Primary | ICD-10-CM

## 2022-10-16 LAB
ALBUMIN SERPL BCP-MCNC: 3.6 G/DL (ref 3.5–5)
ALP SERPL-CCNC: 69 U/L (ref 46–116)
ALT SERPL W P-5'-P-CCNC: 19 U/L (ref 12–78)
ANION GAP SERPL CALCULATED.3IONS-SCNC: 9 MMOL/L (ref 4–13)
AST SERPL W P-5'-P-CCNC: 14 U/L (ref 5–45)
BASOPHILS # BLD AUTO: 0.02 THOUSANDS/ÂΜL (ref 0–0.1)
BASOPHILS NFR BLD AUTO: 0 % (ref 0–1)
BILIRUB SERPL-MCNC: 1.7 MG/DL (ref 0.2–1)
BUN SERPL-MCNC: 5 MG/DL (ref 5–25)
CALCIUM SERPL-MCNC: 9.6 MG/DL (ref 8.3–10.1)
CHLORIDE SERPL-SCNC: 96 MMOL/L (ref 96–108)
CO2 SERPL-SCNC: 28 MMOL/L (ref 21–32)
CREAT SERPL-MCNC: 0.89 MG/DL (ref 0.6–1.3)
EOSINOPHIL # BLD AUTO: 0.01 THOUSAND/ÂΜL (ref 0–0.61)
EOSINOPHIL NFR BLD AUTO: 0 % (ref 0–6)
ERYTHROCYTE [DISTWIDTH] IN BLOOD BY AUTOMATED COUNT: 11.5 % (ref 11.6–15.1)
GFR SERPL CREATININE-BSD FRML MDRD: 112 ML/MIN/1.73SQ M
GLUCOSE SERPL-MCNC: 119 MG/DL (ref 65–140)
HCT VFR BLD AUTO: 37.6 % (ref 36.5–49.3)
HGB BLD-MCNC: 12.7 G/DL (ref 12–17)
IMM GRANULOCYTES # BLD AUTO: 0.04 THOUSAND/UL (ref 0–0.2)
IMM GRANULOCYTES NFR BLD AUTO: 0 % (ref 0–2)
LYMPHOCYTES # BLD AUTO: 1.24 THOUSANDS/ÂΜL (ref 0.6–4.47)
LYMPHOCYTES NFR BLD AUTO: 11 % (ref 14–44)
MCH RBC QN AUTO: 32.9 PG (ref 26.8–34.3)
MCHC RBC AUTO-ENTMCNC: 33.8 G/DL (ref 31.4–37.4)
MCV RBC AUTO: 97 FL (ref 82–98)
MONOCYTES # BLD AUTO: 1.27 THOUSAND/ÂΜL (ref 0.17–1.22)
MONOCYTES NFR BLD AUTO: 11 % (ref 4–12)
NEUTROPHILS # BLD AUTO: 9.26 THOUSANDS/ÂΜL (ref 1.85–7.62)
NEUTS SEG NFR BLD AUTO: 78 % (ref 43–75)
NRBC BLD AUTO-RTO: 0 /100 WBCS
PLATELET # BLD AUTO: 372 THOUSANDS/UL (ref 149–390)
PMV BLD AUTO: 8.5 FL (ref 8.9–12.7)
POTASSIUM SERPL-SCNC: 3.7 MMOL/L (ref 3.5–5.3)
PROT SERPL-MCNC: 8.6 G/DL (ref 6.4–8.4)
RBC # BLD AUTO: 3.86 MILLION/UL (ref 3.88–5.62)
SODIUM SERPL-SCNC: 133 MMOL/L (ref 135–147)
URATE SERPL-MCNC: 8.2 MG/DL (ref 3.5–8.5)
WBC # BLD AUTO: 11.84 THOUSAND/UL (ref 4.31–10.16)

## 2022-10-16 PROCEDURE — 73630 X-RAY EXAM OF FOOT: CPT

## 2022-10-16 PROCEDURE — 84550 ASSAY OF BLOOD/URIC ACID: CPT | Performed by: PHYSICIAN ASSISTANT

## 2022-10-16 PROCEDURE — 99284 EMERGENCY DEPT VISIT MOD MDM: CPT | Performed by: PHYSICIAN ASSISTANT

## 2022-10-16 PROCEDURE — 85025 COMPLETE CBC W/AUTO DIFF WBC: CPT | Performed by: PHYSICIAN ASSISTANT

## 2022-10-16 PROCEDURE — 96374 THER/PROPH/DIAG INJ IV PUSH: CPT

## 2022-10-16 PROCEDURE — 99283 EMERGENCY DEPT VISIT LOW MDM: CPT

## 2022-10-16 PROCEDURE — 73610 X-RAY EXAM OF ANKLE: CPT

## 2022-10-16 PROCEDURE — 36415 COLL VENOUS BLD VENIPUNCTURE: CPT | Performed by: PHYSICIAN ASSISTANT

## 2022-10-16 PROCEDURE — 80053 COMPREHEN METABOLIC PANEL: CPT | Performed by: PHYSICIAN ASSISTANT

## 2022-10-16 RX ORDER — CEPHALEXIN 500 MG/1
500 CAPSULE ORAL EVERY 6 HOURS SCHEDULED
Qty: 28 CAPSULE | Refills: 0 | Status: SHIPPED | OUTPATIENT
Start: 2022-10-16 | End: 2022-10-23

## 2022-10-16 RX ORDER — KETOROLAC TROMETHAMINE 30 MG/ML
15 INJECTION, SOLUTION INTRAMUSCULAR; INTRAVENOUS ONCE
Status: COMPLETED | OUTPATIENT
Start: 2022-10-16 | End: 2022-10-16

## 2022-10-16 RX ORDER — IBUPROFEN 600 MG/1
600 TABLET ORAL EVERY 6 HOURS PRN
Qty: 30 TABLET | Refills: 0 | Status: SHIPPED | OUTPATIENT
Start: 2022-10-16

## 2022-10-16 RX ADMIN — KETOROLAC TROMETHAMINE 15 MG: 30 INJECTION, SOLUTION INTRAMUSCULAR; INTRAVENOUS at 17:12

## 2022-10-16 NOTE — DISCHARGE INSTRUCTIONS
Rest, elevate foot  Take motrin 600mg every 6 hours as needed for pain  Return to ER if symptoms worsen  Follow up with podiatrist for recheck

## 2022-10-16 NOTE — Clinical Note
Deonte oSlo was seen and treated in our emergency department on 10/16/2022  Diagnosis:     Elisa Yip  may return to work on return date  He may return on this date: 10/21/2022         If you have any questions or concerns, please don't hesitate to call        Demetrius Curran PA-C    ______________________________           _______________          _______________  Hospital Representative                              Date                                Time

## 2022-10-16 NOTE — Clinical Note
Rob Salas was seen and treated in our emergency department on 10/16/2022  Diagnosis:     Scot Castillo  may return to work on return date  He may return on this date: 10/21/2022         If you have any questions or concerns, please don't hesitate to call        Ova KATHRINE Richardson    ______________________________           _______________          _______________  Hospital Representative                              Date                                Time

## 2022-10-16 NOTE — ED PROVIDER NOTES
History  Chief Complaint   Patient presents with   • Foot Pain     Patient complaint of right foot swelling and pain x 3 days      Patient is a 33 y/o M that presents to the ED with right foot and ankle pain x 3 days  He denies injury  No fevers, chills  Pain is worse with bearing weight  He states he had this same thing happen in his other foot over a year ago  He denies history of gout  Patient later admitted to falling while playing soccer the day before his pain started  History provided by:  Patient  Foot Injury - Major  Location:  Foot and ankle  Time since incident:  3 days  Lower extremity injury: unsure  Ankle location:  R ankle  Foot location:  Dorsum of R foot  Pain details:     Quality:  Aching    Radiates to:  R leg    Severity:  Moderate    Onset quality:  Gradual    Duration:  3 days    Timing:  Constant    Progression:  Worsening  Chronicity:  New  Tetanus status:  Unknown  Prior injury to area:  No  Relieved by:  Nothing  Worsened by:  Bearing weight  Ineffective treatments:  None tried  Associated symptoms: swelling    Associated symptoms: no decreased ROM, no fever and no numbness        None       History reviewed  No pertinent past medical history  History reviewed  No pertinent surgical history  History reviewed  No pertinent family history  I have reviewed and agree with the history as documented  E-Cigarette/Vaping     E-Cigarette/Vaping Substances     Social History     Tobacco Use   • Smoking status: Current Every Day Smoker     Types: Cigarettes   • Smokeless tobacco: Never Used   Substance Use Topics   • Alcohol use: Yes     Alcohol/week: 6 0 standard drinks     Types: 6 Cans of beer per week     Comment: 6 pack daily   • Drug use: No       Review of Systems   Constitutional: Negative for chills and fever  Respiratory: Negative for shortness of breath  Cardiovascular: Negative for leg swelling     Musculoskeletal:        Right foot and right ankle pain Skin: Positive for color change  Negative for wound  Neurological: Negative for dizziness, weakness and numbness  All other systems reviewed and are negative  Physical Exam  Physical Exam  Vitals and nursing note reviewed  Constitutional:       General: He is not in acute distress  Appearance: Normal appearance  He is well-developed, well-groomed and normal weight  He is not ill-appearing or diaphoretic  HENT:      Head: Normocephalic and atraumatic  Right Ear: External ear normal       Left Ear: External ear normal       Nose: Nose normal    Eyes:      Conjunctiva/sclera: Conjunctivae normal    Cardiovascular:      Rate and Rhythm: Normal rate and regular rhythm  Pulses:           Dorsalis pedis pulses are 2+ on the right side  Posterior tibial pulses are 2+ on the right side  Heart sounds: Normal heart sounds  Pulmonary:      Effort: Pulmonary effort is normal       Breath sounds: Normal breath sounds  No wheezing, rhonchi or rales  Musculoskeletal:      Cervical back: Normal range of motion and neck supple  Right hip: Normal       Right knee: Normal       Right lower leg: Normal       Right ankle: Swelling present  No deformity  Tenderness present over the lateral malleolus  Right foot: Normal capillary refill  Swelling and bony tenderness present  No deformity or laceration  Normal pulse  Feet:    Feet:      Right foot:      Skin integrity: Erythema and warmth present  No skin breakdown  Skin:     General: Skin is warm and dry  Findings: Erythema (right foot) present  No abrasion or wound  Neurological:      Mental Status: He is alert and oriented to person, place, and time  Sensory: Sensation is intact  Motor: Motor function is intact  Gait: Gait is intact  Psychiatric:         Mood and Affect: Mood normal          Speech: Speech normal          Behavior: Behavior is cooperative               Vital Signs  ED Triage Vitals Temperature Pulse Respirations Blood Pressure SpO2   10/16/22 1306 10/16/22 1307 10/16/22 1307 10/16/22 1307 10/16/22 1307   97 8 °F (36 6 °C) 104 18 144/97 98 %      Temp src Heart Rate Source Patient Position - Orthostatic VS BP Location FiO2 (%)   -- 10/16/22 1609 -- 10/16/22 1609 --    Monitor  Right arm       Pain Score       --                  Vitals:    10/16/22 1307 10/16/22 1609 10/16/22 1700   BP: 144/97 144/87 124/82   Pulse: 104  100         Visual Acuity      ED Medications  Medications   ketorolac (TORADOL) injection 15 mg (15 mg Intravenous Given 10/16/22 1712)       Diagnostic Studies  Results Reviewed     Procedure Component Value Units Date/Time    Comprehensive metabolic panel [[id number]]  (Abnormal) Collected: 10/16/22 1620    Lab Status: Final result Specimen: Blood from Arm, Right Updated: 10/16/22 1650     Sodium 133 mmol/L      Potassium 3 7 mmol/L      Chloride 96 mmol/L      CO2 28 mmol/L      ANION GAP 9 mmol/L      BUN 5 mg/dL      Creatinine 0 89 mg/dL      Glucose 119 mg/dL      Calcium 9 6 mg/dL      AST 14 U/L      ALT 19 U/L      Alkaline Phosphatase 69 U/L      Total Protein 8 6 g/dL      Albumin 3 6 g/dL      Total Bilirubin 1 70 mg/dL      eGFR 112 ml/min/1 73sq m     Narrative:      Anita guidelines for Chronic Kidney Disease (CKD):   •  Stage 1 with normal or high GFR (GFR > 90 mL/min/1 73 square meters)  •  Stage 2 Mild CKD (GFR = 60-89 mL/min/1 73 square meters)  •  Stage 3A Moderate CKD (GFR = 45-59 mL/min/1 73 square meters)  •  Stage 3B Moderate CKD (GFR = 30-44 mL/min/1 73 square meters)  •  Stage 4 Severe CKD (GFR = 15-29 mL/min/1 73 square meters)  •  Stage 5 End Stage CKD (GFR <15 mL/min/1 73 square meters)  Note: GFR calculation is accurate only with a steady state creatinine    Uric acid [[id number]]  (Normal) Collected: 10/16/22 1620    Lab Status: Final result Specimen: Blood from Arm, Right Updated: 10/16/22 1650     Uric Acid 8 2 mg/dL     CBC and differential [[id number]]  (Abnormal) Collected: 10/16/22 1620    Lab Status: Final result Specimen: Blood from Arm, Right Updated: 10/16/22 1633     WBC 11 84 Thousand/uL      RBC 3 86 Million/uL      Hemoglobin 12 7 g/dL      Hematocrit 37 6 %      MCV 97 fL      MCH 32 9 pg      MCHC 33 8 g/dL      RDW 11 5 %      MPV 8 5 fL      Platelets 926 Thousands/uL      nRBC 0 /100 WBCs      Neutrophils Relative 78 %      Immat GRANS % 0 %      Lymphocytes Relative 11 %      Monocytes Relative 11 %      Eosinophils Relative 0 %      Basophils Relative 0 %      Neutrophils Absolute 9 26 Thousands/µL      Immature Grans Absolute 0 04 Thousand/uL      Lymphocytes Absolute 1 24 Thousands/µL      Monocytes Absolute 1 27 Thousand/µL      Eosinophils Absolute 0 01 Thousand/µL      Basophils Absolute 0 02 Thousands/µL                  XR foot 3+ views RIGHT    (Results Pending)   XR ankle 3+ views RIGHT    (Results Pending)              Procedures  Procedures         ED Course                                             MDM  Number of Diagnoses or Management Options  Cellulitis of right foot: new and requires workup  Diagnosis management comments: Patient with swelling and pain right foot  Unsure if he had injury, WBC slightly elevated, will start on abx concern for cellulitis  Will also prescribe NSAIDS since there is a concern for gout  Patient referred to podiatry  REturn precautions given          Amount and/or Complexity of Data Reviewed  Clinical lab tests: reviewed and ordered  Tests in the radiology section of CPT®: ordered and reviewed  Independent visualization of images, tracings, or specimens: yes    Patient Progress  Patient progress: stable      Disposition  Final diagnoses:   Cellulitis of right foot     Time reflects when diagnosis was documented in both MDM as applicable and the Disposition within this note     Time User Action Codes Description Comment    10/16/2022  5:08 PM Joel Sullivan ELIAZAR Add [B66 368] Cellulitis of right foot       ED Disposition     ED Disposition   Discharge    Condition   Stable    Date/Time   Sun Oct 16, 2022  5:08 PM    [address] [place] discharge to home/self care  Follow-up Information     Follow up With Specialties Details Why Contact Info    Crys Fay DPM Podiatry, Wound Care Schedule an appointment as soon as possible for a visit  For recheck [place] [id number] [place] [address]  [phone]            Discharge Medication List as of 10/16/2022  5:11 PM      START taking these medications    Details   cephalexin (KEFLEX) 500 mg capsule Take 1 capsule (500 mg total) by mouth every 6 (six) hours for 7 days, Starting Sun 10/16/2022, Until Sun 10/23/2022, Normal      ibuprofen (MOTRIN) 600 mg tablet Take 1 tablet (600 mg total) by mouth every 6 (six) hours as needed for mild pain, Starting Sun 10/16/2022, Normal             No discharge procedures on file      PDMP Review     None          ED Provider  Electronically Signed by           Renzo Rodriguez PA-C  10/16/22 3847

## 2023-01-11 ENCOUNTER — APPOINTMENT (EMERGENCY)
Dept: RADIOLOGY | Facility: HOSPITAL | Age: 33
End: 2023-01-11

## 2023-01-11 ENCOUNTER — HOSPITAL ENCOUNTER (EMERGENCY)
Facility: HOSPITAL | Age: 33
Discharge: HOME | End: 2023-01-12
Attending: EMERGENCY MEDICINE

## 2023-01-11 ENCOUNTER — APPOINTMENT (EMERGENCY)
Dept: CT IMAGING | Facility: HOSPITAL | Age: 33
End: 2023-01-11

## 2023-01-11 DIAGNOSIS — Z78.9 ALCOHOL USE: ICD-10-CM

## 2023-01-11 DIAGNOSIS — S09.93XA DENTAL INJURY, INITIAL ENCOUNTER: ICD-10-CM

## 2023-01-11 DIAGNOSIS — S00.511A ABRASION OF LIP, INITIAL ENCOUNTER: ICD-10-CM

## 2023-01-11 DIAGNOSIS — S16.1XXA STRAIN OF NECK MUSCLE, INITIAL ENCOUNTER: ICD-10-CM

## 2023-01-11 DIAGNOSIS — S09.90XA INJURY OF HEAD, INITIAL ENCOUNTER: ICD-10-CM
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
## 2023-01-11 LAB
ABO GROUP BLD: NORMAL
ALBUMIN SERPL BCP-MCNC: 3.9 G/DL (ref 3.5–5)
ALP SERPL-CCNC: 78 U/L (ref 46–116)
ALT SERPL W P-5'-P-CCNC: 51 U/L (ref 12–78)
ANION GAP SERPL CALCULATED.3IONS-SCNC: 7 MMOL/L (ref 4–13)
APTT PPP: 27 SECONDS (ref 23–37)
AST SERPL W P-5'-P-CCNC: 70 U/L (ref 5–45)
BASOPHILS # BLD AUTO: 0.02 THOUSANDS/ÂΜL (ref 0–0.1)
BASOPHILS NFR BLD AUTO: 0 % (ref 0–1)
BILIRUB SERPL-MCNC: 0.1 MG/DL (ref 0.2–1)
BLD GP AB SCN SERPL QL: NEGATIVE
BUN SERPL-MCNC: 9 MG/DL (ref 5–25)
CALCIUM SERPL-MCNC: 9 MG/DL (ref 8.3–10.1)
CARDIAC TROPONIN I PNL SERPL HS: <2 NG/L
CHLORIDE SERPL-SCNC: 104 MMOL/L (ref 96–108)
CO2 SERPL-SCNC: 29 MMOL/L (ref 21–32)
CREAT SERPL-MCNC: 0.74 MG/DL (ref 0.6–1.3)
EOSINOPHIL # BLD AUTO: 0.08 THOUSAND/ÂΜL (ref 0–0.61)
EOSINOPHIL NFR BLD AUTO: 1 % (ref 0–6)
ERYTHROCYTE [DISTWIDTH] IN BLOOD BY AUTOMATED COUNT: 12.1 % (ref 11.6–15.1)
ETHANOL SERPL-MCNC: 319 MG/DL (ref 0–3)
GFR SERPL CREATININE-BSD FRML MDRD: 121 ML/MIN/1.73SQ M
GLUCOSE SERPL-MCNC: 112 MG/DL (ref 65–140)
HCT VFR BLD AUTO: 43.8 % (ref 36.5–49.3)
HGB BLD-MCNC: 14.9 G/DL (ref 12–17)
IMM GRANULOCYTES # BLD AUTO: 0.03 THOUSAND/UL (ref 0–0.2)
IMM GRANULOCYTES NFR BLD AUTO: 1 % (ref 0–2)
INR PPP: 0.92 (ref 0.84–1.19)
LYMPHOCYTES # BLD AUTO: 2.73 THOUSANDS/ÂΜL (ref 0.6–4.47)
LYMPHOCYTES NFR BLD AUTO: 48 % (ref 14–44)
MCH RBC QN AUTO: 32.3 PG (ref 26.8–34.3)
MCHC RBC AUTO-ENTMCNC: 34 G/DL (ref 31.4–37.4)
MCV RBC AUTO: 95 FL (ref 82–98)
MONOCYTES # BLD AUTO: 0.38 THOUSAND/ÂΜL (ref 0.17–1.22)
MONOCYTES NFR BLD AUTO: 7 % (ref 4–12)
NEUTROPHILS # BLD AUTO: 2.45 THOUSANDS/ÂΜL (ref 1.85–7.62)
NEUTS SEG NFR BLD AUTO: 43 % (ref 43–75)
NRBC BLD AUTO-RTO: 0 /100 WBCS
PLATELET # BLD AUTO: 309 THOUSANDS/UL (ref 149–390)
PMV BLD AUTO: 8.2 FL (ref 8.9–12.7)
POTASSIUM SERPL-SCNC: 3.9 MMOL/L (ref 3.5–5.3)
PROT SERPL-MCNC: 8.4 G/DL (ref 6.4–8.4)
PROTHROMBIN TIME: 13 SECONDS (ref 11.6–14.5)
RBC # BLD AUTO: 4.61 MILLION/UL (ref 3.88–5.62)
RH BLD: POSITIVE
SODIUM SERPL-SCNC: 140 MMOL/L (ref 135–147)
SPECIMEN EXPIRATION DATE: NORMAL
WBC # BLD AUTO: 5.69 THOUSAND/UL (ref 4.31–10.16)

## 2023-01-11 RX ADMIN — IOHEXOL 100 ML: 350 INJECTION, SOLUTION INTRAVENOUS at 22:33

## 2023-01-12 VITALS
HEART RATE: 100 BPM | WEIGHT: 127 LBS | OXYGEN SATURATION: 98 % | BODY MASS INDEX: 22.5 KG/M2 | DIASTOLIC BLOOD PRESSURE: 72 MMHG | TEMPERATURE: 97.8 F | SYSTOLIC BLOOD PRESSURE: 119 MMHG | RESPIRATION RATE: 20 BRPM

## 2023-01-12 LAB
ATRIAL RATE: 101 BPM
P AXIS: 64 DEGREES
PR INTERVAL: 138 MS
QRS AXIS: 51 DEGREES
QRSD INTERVAL: 92 MS
QT INTERVAL: 326 MS
QTC INTERVAL: 422 MS
T WAVE AXIS: 24 DEGREES
VENTRICULAR RATE: 101 BPM

## 2023-01-12 RX ORDER — ACETAMINOPHEN 325 MG/1
975 TABLET ORAL ONCE
Status: DISCONTINUED | OUTPATIENT
Start: 2023-01-12 | End: 2023-01-12 | Stop reason: HOSPADM
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
## 2024-07-01 ENCOUNTER — HOSPITAL ENCOUNTER (EMERGENCY)
Facility: HOSPITAL | Age: 34
Discharge: HOME | End: 2024-07-01
Attending: EMERGENCY MEDICINE
Payer: COMMERCIAL

## 2024-07-01 VITALS
OXYGEN SATURATION: 100 % | RESPIRATION RATE: 22 BRPM | DIASTOLIC BLOOD PRESSURE: 88 MMHG | HEART RATE: 88 BPM | SYSTOLIC BLOOD PRESSURE: 139 MMHG

## 2024-07-01 DIAGNOSIS — R07.81 RIB PAIN ON RIGHT SIDE: Primary | ICD-10-CM

## 2024-07-01 PROCEDURE — 99283 EMERGENCY DEPT VISIT LOW MDM: CPT | Performed by: EMERGENCY MEDICINE

## 2024-07-01 PROCEDURE — 99283 EMERGENCY DEPT VISIT LOW MDM: CPT
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
## 2024-07-02 NOTE — DISCHARGE INSTRUCTIONS
You have been seen for rib pain. Please take tylenol and motrin for discomfort. Return to the emergency department if you develop worsening pain, trouble breathing, lightheadedness or any other symptoms of concern. Please follow up with a PCP by calling the number provided.    Le gautam atendido por dolor en las costillas. Por favor, tome tylenol y motrin para las molestias. Regrese a la denzel de emergencias si presenta un empeoramiento del dolor, dificultad para respirar, mareos o cualquier otro síntoma preocupante. Comuníquese con un PCP llamando al número proporcionado.

## 2024-09-20 ENCOUNTER — APPOINTMENT (EMERGENCY)
Dept: RADIOLOGY | Facility: HOSPITAL | Age: 34
DRG: 422 | End: 2024-09-20
Payer: MEDICARE

## 2024-09-20 ENCOUNTER — HOSPITAL ENCOUNTER (INPATIENT)
Facility: HOSPITAL | Age: 34
LOS: 1 days | Discharge: HOME | DRG: 422 | End: 2024-09-21
Attending: EMERGENCY MEDICINE | Admitting: INTERNAL MEDICINE
Payer: MEDICARE

## 2024-09-20 ENCOUNTER — APPOINTMENT (EMERGENCY)
Dept: CT IMAGING | Facility: HOSPITAL | Age: 34
DRG: 422 | End: 2024-09-20
Payer: MEDICARE

## 2024-09-20 DIAGNOSIS — R11.2 NAUSEA AND VOMITING: ICD-10-CM

## 2024-09-20 DIAGNOSIS — F10.939 ALCOHOL WITHDRAWAL (HCC): Primary | ICD-10-CM

## 2024-09-20 DIAGNOSIS — E86.0 DEHYDRATION: ICD-10-CM

## 2024-09-20 DIAGNOSIS — R10.9 ABDOMINAL PAIN: ICD-10-CM

## 2024-09-20 DIAGNOSIS — E03.8 TSH DEFICIENCY: ICD-10-CM

## 2024-09-20 PROBLEM — I16.0 HYPERTENSIVE URGENCY: Status: ACTIVE | Noted: 2024-09-20

## 2024-09-20 PROBLEM — E87.29 HIGH ANION GAP METABOLIC ACIDOSIS: Status: ACTIVE | Noted: 2024-09-20

## 2024-09-20 PROBLEM — R79.89 TSH ELEVATION: Status: ACTIVE | Noted: 2024-09-20

## 2024-09-20 PROBLEM — I16.0 HYPERTENSIVE URGENCY: Status: RESOLVED | Noted: 2024-09-20 | Resolved: 2024-09-20

## 2024-09-20 LAB
ALBUMIN SERPL BCG-MCNC: 4.6 G/DL (ref 3.5–5)
ALP SERPL-CCNC: 66 U/L (ref 34–104)
ALT SERPL W P-5'-P-CCNC: 39 U/L (ref 7–52)
ANION GAP SERPL CALCULATED.3IONS-SCNC: 14 MMOL/L (ref 4–13)
ANION GAP SERPL CALCULATED.3IONS-SCNC: 24 MMOL/L (ref 4–13)
APAP SERPL-MCNC: <10 UG/ML (ref 10–20)
APTT PPP: 24 SECONDS (ref 23–34)
AST SERPL W P-5'-P-CCNC: 52 U/L (ref 5–45)
B-OH-BUTYR SERPL-MCNC: <0.05 MMOL/L (ref 0.02–0.27)
BACTERIA UR QL AUTO: ABNORMAL /HPF
BASE EXCESS BLDA CALC-SCNC: 1 MMOL/L (ref -2–3)
BASOPHILS # BLD AUTO: 0.01 THOUSANDS/ΜL (ref 0–0.1)
BASOPHILS NFR BLD AUTO: 0 % (ref 0–1)
BILIRUB SERPL-MCNC: 0.81 MG/DL (ref 0.2–1)
BILIRUB UR QL STRIP: NEGATIVE
BUN SERPL-MCNC: 5 MG/DL (ref 5–25)
BUN SERPL-MCNC: 9 MG/DL (ref 5–25)
CA-I BLD-SCNC: 1.11 MMOL/L (ref 1.12–1.32)
CALCIUM SERPL-MCNC: 8.6 MG/DL (ref 8.4–10.2)
CALCIUM SERPL-MCNC: 9 MG/DL (ref 8.4–10.2)
CARDIAC TROPONIN I PNL SERPL HS: <2 NG/L
CHLORIDE SERPL-SCNC: 98 MMOL/L (ref 96–108)
CHLORIDE SERPL-SCNC: 99 MMOL/L (ref 96–108)
CLARITY UR: CLEAR
CO2 SERPL-SCNC: 16 MMOL/L (ref 21–32)
CO2 SERPL-SCNC: 23 MMOL/L (ref 21–32)
COLOR UR: ABNORMAL
CREAT SERPL-MCNC: 0.8 MG/DL (ref 0.6–1.3)
CREAT SERPL-MCNC: 0.94 MG/DL (ref 0.6–1.3)
EOSINOPHIL # BLD AUTO: 0.01 THOUSAND/ΜL (ref 0–0.61)
EOSINOPHIL NFR BLD AUTO: 0 % (ref 0–6)
ERYTHROCYTE [DISTWIDTH] IN BLOOD BY AUTOMATED COUNT: 13 % (ref 11.6–15.1)
ETHANOL SERPL-MCNC: <10 MG/DL
ETHANOL SERPL-MCNC: <10 MG/DL
FLUAV RNA RESP QL NAA+PROBE: NEGATIVE
FLUBV RNA RESP QL NAA+PROBE: NEGATIVE
GLUCOSE SERPL-MCNC: 112 MG/DL (ref 65–140)
GLUCOSE SERPL-MCNC: 122 MG/DL (ref 65–140)
GLUCOSE SERPL-MCNC: 144 MG/DL (ref 65–140)
GLUCOSE SERPL-MCNC: 171 MG/DL (ref 65–140)
GLUCOSE UR STRIP-MCNC: ABNORMAL MG/DL
HCO3 BLDA-SCNC: 25.7 MMOL/L (ref 24–30)
HCT VFR BLD AUTO: 38.4 % (ref 36.5–46.1)
HCT VFR BLD CALC: 38 % (ref 36.5–46.1)
HGB BLD-MCNC: 13.6 G/DL (ref 12–15.4)
HGB BLDA-MCNC: 12.9 G/DL (ref 12–15.4)
HGB UR QL STRIP.AUTO: NEGATIVE
IMM GRANULOCYTES # BLD AUTO: 0.02 THOUSAND/UL (ref 0–0.2)
IMM GRANULOCYTES NFR BLD AUTO: 1 % (ref 0–2)
INR PPP: 1.04 (ref 0.85–1.19)
KETONES UR STRIP-MCNC: NEGATIVE MG/DL
LACTATE SERPL-SCNC: 1.7 MMOL/L (ref 0.5–2)
LACTATE SERPL-SCNC: 5 MMOL/L (ref 0.5–2)
LACTATE SERPL-SCNC: 5 MMOL/L (ref 0.5–2)
LEUKOCYTE ESTERASE UR QL STRIP: NEGATIVE
LIPASE SERPL-CCNC: 34 U/L (ref 11–82)
LYMPHOCYTES # BLD AUTO: 0.42 THOUSANDS/ΜL (ref 0.6–4.47)
LYMPHOCYTES NFR BLD AUTO: 10 % (ref 14–44)
MAGNESIUM SERPL-MCNC: 1.4 MG/DL (ref 1.9–2.7)
MCH RBC QN AUTO: 34.4 PG (ref 26.8–34.3)
MCHC RBC AUTO-ENTMCNC: 35.4 G/DL (ref 31.4–37.4)
MCV RBC AUTO: 97 FL (ref 82–98)
MONOCYTES # BLD AUTO: 0.5 THOUSAND/ΜL (ref 0.17–1.22)
MONOCYTES NFR BLD AUTO: 12 % (ref 4–12)
MUCOUS THREADS UR QL AUTO: ABNORMAL
NEUTROPHILS # BLD AUTO: 3.16 THOUSANDS/ΜL (ref 1.85–7.62)
NEUTS SEG NFR BLD AUTO: 77 % (ref 43–75)
NITRITE UR QL STRIP: NEGATIVE
NON-SQ EPI CELLS URNS QL MICRO: ABNORMAL /HPF
NRBC BLD AUTO-RTO: 0 /100 WBCS
PCO2 BLD: 27 MMOL/L (ref 21–32)
PCO2 BLD: 41.3 MM HG (ref 42–50)
PH BLD: 7.4 [PH] (ref 7.3–7.4)
PH UR STRIP.AUTO: 8.5 [PH]
PLATELET # BLD AUTO: 167 THOUSANDS/UL (ref 149–390)
PMV BLD AUTO: 8.8 FL (ref 8.9–12.7)
PO2 BLD: 42 MM HG (ref 35–45)
POTASSIUM BLD-SCNC: 3.9 MMOL/L (ref 3.5–5.3)
POTASSIUM SERPL-SCNC: 3.6 MMOL/L (ref 3.5–5.3)
POTASSIUM SERPL-SCNC: 3.7 MMOL/L (ref 3.5–5.3)
PROT SERPL-MCNC: 7.9 G/DL (ref 6.4–8.4)
PROT UR STRIP-MCNC: ABNORMAL MG/DL
PROTHROMBIN TIME: 14.1 SECONDS (ref 12.3–15)
RBC # BLD AUTO: 3.95 MILLION/UL (ref 3.88–5.12)
RBC #/AREA URNS AUTO: ABNORMAL /HPF
RSV RNA RESP QL NAA+PROBE: NEGATIVE
SALICYLATES SERPL-MCNC: <5 MG/DL (ref 3–20)
SAO2 % BLD FROM PO2: 78 % (ref 60–85)
SARS-COV-2 RNA RESP QL NAA+PROBE: NEGATIVE
SODIUM BLD-SCNC: 137 MMOL/L (ref 136–145)
SODIUM SERPL-SCNC: 135 MMOL/L (ref 135–147)
SODIUM SERPL-SCNC: 139 MMOL/L (ref 135–147)
SP GR UR STRIP.AUTO: <1.005 (ref 1–1.03)
SPECIMEN SOURCE: ABNORMAL
TSH SERPL DL<=0.05 MIU/L-ACNC: 0.25 UIU/ML (ref 0.45–4.5)
UROBILINOGEN UR STRIP-ACNC: <2 MG/DL
WBC # BLD AUTO: 4.12 THOUSAND/UL (ref 4.31–10.16)
WBC #/AREA URNS AUTO: ABNORMAL /HPF

## 2024-09-20 PROCEDURE — 85730 THROMBOPLASTIN TIME PARTIAL: CPT

## 2024-09-20 PROCEDURE — 83605 ASSAY OF LACTIC ACID: CPT | Performed by: PHYSICIAN ASSISTANT

## 2024-09-20 PROCEDURE — 82010 KETONE BODYS QUAN: CPT

## 2024-09-20 PROCEDURE — 74177 CT ABD & PELVIS W/CONTRAST: CPT

## 2024-09-20 PROCEDURE — 71046 X-RAY EXAM CHEST 2 VIEWS: CPT

## 2024-09-20 PROCEDURE — 96367 TX/PROPH/DG ADDL SEQ IV INF: CPT

## 2024-09-20 PROCEDURE — 96366 THER/PROPH/DIAG IV INF ADDON: CPT

## 2024-09-20 PROCEDURE — 84439 ASSAY OF FREE THYROXINE: CPT

## 2024-09-20 PROCEDURE — 36415 COLL VENOUS BLD VENIPUNCTURE: CPT

## 2024-09-20 PROCEDURE — 82947 ASSAY GLUCOSE BLOOD QUANT: CPT

## 2024-09-20 PROCEDURE — 96375 TX/PRO/DX INJ NEW DRUG ADDON: CPT

## 2024-09-20 PROCEDURE — 84484 ASSAY OF TROPONIN QUANT: CPT

## 2024-09-20 PROCEDURE — 80179 DRUG ASSAY SALICYLATE: CPT | Performed by: PHYSICIAN ASSISTANT

## 2024-09-20 PROCEDURE — 82803 BLOOD GASES ANY COMBINATION: CPT

## 2024-09-20 PROCEDURE — 82948 REAGENT STRIP/BLOOD GLUCOSE: CPT

## 2024-09-20 PROCEDURE — 99285 EMERGENCY DEPT VISIT HI MDM: CPT

## 2024-09-20 PROCEDURE — 84295 ASSAY OF SERUM SODIUM: CPT

## 2024-09-20 PROCEDURE — 99284 EMERGENCY DEPT VISIT MOD MDM: CPT

## 2024-09-20 PROCEDURE — 80053 COMPREHEN METABOLIC PANEL: CPT

## 2024-09-20 PROCEDURE — 85014 HEMATOCRIT: CPT

## 2024-09-20 PROCEDURE — 84480 ASSAY TRIIODOTHYRONINE (T3): CPT

## 2024-09-20 PROCEDURE — 82077 ASSAY SPEC XCP UR&BREATH IA: CPT | Performed by: PHYSICIAN ASSISTANT

## 2024-09-20 PROCEDURE — 81001 URINALYSIS AUTO W/SCOPE: CPT

## 2024-09-20 PROCEDURE — 83605 ASSAY OF LACTIC ACID: CPT

## 2024-09-20 PROCEDURE — 85025 COMPLETE CBC W/AUTO DIFF WBC: CPT

## 2024-09-20 PROCEDURE — 0241U HB NFCT DS VIR RESP RNA 4 TRGT: CPT | Performed by: PHYSICIAN ASSISTANT

## 2024-09-20 PROCEDURE — 84443 ASSAY THYROID STIM HORMONE: CPT

## 2024-09-20 PROCEDURE — 70450 CT HEAD/BRAIN W/O DYE: CPT

## 2024-09-20 PROCEDURE — 93005 ELECTROCARDIOGRAM TRACING: CPT

## 2024-09-20 PROCEDURE — 85610 PROTHROMBIN TIME: CPT

## 2024-09-20 PROCEDURE — 82330 ASSAY OF CALCIUM: CPT

## 2024-09-20 PROCEDURE — 96365 THER/PROPH/DIAG IV INF INIT: CPT

## 2024-09-20 PROCEDURE — 83690 ASSAY OF LIPASE: CPT

## 2024-09-20 PROCEDURE — 80143 DRUG ASSAY ACETAMINOPHEN: CPT | Performed by: PHYSICIAN ASSISTANT

## 2024-09-20 PROCEDURE — 84132 ASSAY OF SERUM POTASSIUM: CPT

## 2024-09-20 PROCEDURE — 82077 ASSAY SPEC XCP UR&BREATH IA: CPT

## 2024-09-20 PROCEDURE — 83735 ASSAY OF MAGNESIUM: CPT

## 2024-09-20 PROCEDURE — 99223 1ST HOSP IP/OBS HIGH 75: CPT | Performed by: PHYSICIAN ASSISTANT

## 2024-09-20 PROCEDURE — 96368 THER/DIAG CONCURRENT INF: CPT

## 2024-09-20 PROCEDURE — 80048 BASIC METABOLIC PNL TOTAL CA: CPT | Performed by: PHYSICIAN ASSISTANT

## 2024-09-20 RX ORDER — SODIUM CHLORIDE, SODIUM GLUCONATE, SODIUM ACETATE, POTASSIUM CHLORIDE, MAGNESIUM CHLORIDE, SODIUM PHOSPHATE, DIBASIC, AND POTASSIUM PHOSPHATE .53; .5; .37; .037; .03; .012; .00082 G/100ML; G/100ML; G/100ML; G/100ML; G/100ML; G/100ML; G/100ML
125 INJECTION, SOLUTION INTRAVENOUS CONTINUOUS
Status: DISCONTINUED | OUTPATIENT
Start: 2024-09-20 | End: 2024-09-21

## 2024-09-20 RX ORDER — SODIUM CHLORIDE, SODIUM GLUCONATE, SODIUM ACETATE, POTASSIUM CHLORIDE, MAGNESIUM CHLORIDE, SODIUM PHOSPHATE, DIBASIC, AND POTASSIUM PHOSPHATE .53; .5; .37; .037; .03; .012; .00082 G/100ML; G/100ML; G/100ML; G/100ML; G/100ML; G/100ML; G/100ML
1000 INJECTION, SOLUTION INTRAVENOUS ONCE
Status: COMPLETED | OUTPATIENT
Start: 2024-09-20 | End: 2024-09-20

## 2024-09-20 RX ORDER — LANOLIN ALCOHOL/MO/W.PET/CERES
100 CREAM (GRAM) TOPICAL ONCE
Status: DISCONTINUED | OUTPATIENT
Start: 2024-09-20 | End: 2024-09-20

## 2024-09-20 RX ORDER — PANTOPRAZOLE SODIUM 40 MG/10ML
40 INJECTION, POWDER, LYOPHILIZED, FOR SOLUTION INTRAVENOUS
Status: DISCONTINUED | OUTPATIENT
Start: 2024-09-20 | End: 2024-09-21 | Stop reason: HOSPADM

## 2024-09-20 RX ORDER — ONDANSETRON 2 MG/ML
4 INJECTION INTRAMUSCULAR; INTRAVENOUS EVERY 6 HOURS PRN
Status: DISCONTINUED | OUTPATIENT
Start: 2024-09-20 | End: 2024-09-21 | Stop reason: HOSPADM

## 2024-09-20 RX ORDER — FOLIC ACID 1 MG/1
1 TABLET ORAL ONCE
Status: DISCONTINUED | OUTPATIENT
Start: 2024-09-20 | End: 2024-09-20

## 2024-09-20 RX ORDER — MAGNESIUM SULFATE HEPTAHYDRATE 40 MG/ML
4 INJECTION, SOLUTION INTRAVENOUS ONCE
Status: COMPLETED | OUTPATIENT
Start: 2024-09-20 | End: 2024-09-20

## 2024-09-20 RX ORDER — POTASSIUM CHLORIDE 1500 MG/1
40 TABLET, EXTENDED RELEASE ORAL ONCE
Status: COMPLETED | OUTPATIENT
Start: 2024-09-20 | End: 2024-09-20

## 2024-09-20 RX ORDER — ONDANSETRON 2 MG/ML
4 INJECTION INTRAMUSCULAR; INTRAVENOUS ONCE
Status: COMPLETED | OUTPATIENT
Start: 2024-09-20 | End: 2024-09-20

## 2024-09-20 RX ORDER — CHLORHEXIDINE GLUCONATE ORAL RINSE 1.2 MG/ML
15 SOLUTION DENTAL EVERY 12 HOURS SCHEDULED
Status: DISCONTINUED | OUTPATIENT
Start: 2024-09-20 | End: 2024-09-21 | Stop reason: HOSPADM

## 2024-09-20 RX ORDER — ACETAMINOPHEN 325 MG/1
650 TABLET ORAL EVERY 6 HOURS PRN
Status: DISCONTINUED | OUTPATIENT
Start: 2024-09-20 | End: 2024-09-21 | Stop reason: HOSPADM

## 2024-09-20 RX ORDER — ENOXAPARIN SODIUM 100 MG/ML
40 INJECTION SUBCUTANEOUS DAILY
Status: DISCONTINUED | OUTPATIENT
Start: 2024-09-21 | End: 2024-09-21 | Stop reason: HOSPADM

## 2024-09-20 RX ORDER — PANTOPRAZOLE SODIUM 40 MG/10ML
40 INJECTION, POWDER, LYOPHILIZED, FOR SOLUTION INTRAVENOUS
Status: DISCONTINUED | OUTPATIENT
Start: 2024-09-21 | End: 2024-09-20

## 2024-09-20 RX ORDER — DIAZEPAM 10 MG/2ML
10 INJECTION, SOLUTION INTRAMUSCULAR; INTRAVENOUS ONCE
Status: COMPLETED | OUTPATIENT
Start: 2024-09-20 | End: 2024-09-20

## 2024-09-20 RX ADMIN — FOLIC ACID: 5 INJECTION, SOLUTION INTRAMUSCULAR; INTRAVENOUS; SUBCUTANEOUS at 15:58

## 2024-09-20 RX ADMIN — ONDANSETRON 4 MG: 2 INJECTION, SOLUTION INTRAMUSCULAR; INTRAVENOUS at 19:31

## 2024-09-20 RX ADMIN — PHENOBARBITAL SODIUM 520 MG: 130 INJECTION INTRAMUSCULAR at 16:21

## 2024-09-20 RX ADMIN — SODIUM CHLORIDE, SODIUM GLUCONATE, SODIUM ACETATE, POTASSIUM CHLORIDE, MAGNESIUM CHLORIDE, SODIUM PHOSPHATE, DIBASIC, AND POTASSIUM PHOSPHATE 1000 ML: .53; .5; .37; .037; .03; .012; .00082 INJECTION, SOLUTION INTRAVENOUS at 15:24

## 2024-09-20 RX ADMIN — SODIUM CHLORIDE, SODIUM GLUCONATE, SODIUM ACETATE, POTASSIUM CHLORIDE, MAGNESIUM CHLORIDE, SODIUM PHOSPHATE, DIBASIC, AND POTASSIUM PHOSPHATE 125 ML/HR: .53; .5; .37; .037; .03; .012; .00082 INJECTION, SOLUTION INTRAVENOUS at 19:23

## 2024-09-20 RX ADMIN — MAGNESIUM SULFATE HEPTAHYDRATE 4 G: 40 INJECTION, SOLUTION INTRAVENOUS at 15:58

## 2024-09-20 RX ADMIN — ONDANSETRON 4 MG: 2 INJECTION, SOLUTION INTRAMUSCULAR; INTRAVENOUS at 15:20

## 2024-09-20 RX ADMIN — PANTOPRAZOLE SODIUM 40 MG: 40 INJECTION, POWDER, FOR SOLUTION INTRAVENOUS at 20:56

## 2024-09-20 RX ADMIN — DIAZEPAM 10 MG: 10 INJECTION, SOLUTION INTRAMUSCULAR; INTRAVENOUS at 15:15

## 2024-09-20 RX ADMIN — POTASSIUM CHLORIDE 40 MEQ: 1500 TABLET, EXTENDED RELEASE ORAL at 21:13

## 2024-09-20 RX ADMIN — IOHEXOL 100 ML: 350 INJECTION, SOLUTION INTRAVENOUS at 16:54

## 2024-09-20 RX ADMIN — SODIUM CHLORIDE, SODIUM GLUCONATE, SODIUM ACETATE, POTASSIUM CHLORIDE, MAGNESIUM CHLORIDE, SODIUM PHOSPHATE, DIBASIC, AND POTASSIUM PHOSPHATE 1000 ML: .53; .5; .37; .037; .03; .012; .00082 INJECTION, SOLUTION INTRAVENOUS at 18:34

## 2024-09-20 RX ADMIN — ACETAMINOPHEN 650 MG: 325 TABLET ORAL at 21:13

## 2024-09-20 RX ADMIN — MULTIPLE VITAMINS W/ MINERALS TAB 1 TABLET: TAB ORAL at 15:57

## 2024-09-20 RX ADMIN — CHLORHEXIDINE GLUCONATE 15 ML: 1.2 RINSE ORAL at 20:19

## 2024-09-20 NOTE — LETTER
[facility] INTENSIVE CARE UNIT  [address]  [place] [address]  Dept: [phone]    September 21, 2024     Patient: Corbin Baldwin   YOB: 1990   Date of Visit: 9/20/2024       To Whom it May Concern:    Corbin Baldwin is under my professional care. He was seen in the hospital from 9/20/2024 to 09/21/24. He may return to work on 9/23/24 without limitations.    If you have any questions or concerns, please don't hesitate to call.         Sincerely,          YORDAN Leal

## 2024-09-20 NOTE — H&P
H&P - Critical Care/ICU   Name: Corbin Baldwin 34 y.o. adult I MRN: [id number]  Unit/Bed#: -01 I Date of Admission: 9/20/2024   Date of Service: 9/20/2024 I Hospital Day: 0       Assessment & Plan  Alcohol withdrawal (HCC)  Last drink: 9/20 at 7AM  4 large beers daily  Ethanol level <10  In ED, received 10mg IV valium and 520mg Phenobarbital    Plan  CIWA  Seizure precautions  Folic acid/thiamine/Multivitamin  High anion gap metabolic acidosis  Presented to ED for c/o myalgias, abdominal pain, nausea, vomiting and inability to tolerate PO intake  pH 7.403, serum CO2 16, AG 24, LA 5.0, BHB: < 0.05  CTAP without acute findings  May be secondary to dehydration/vomiting  UA without ketones; unlikely to be starvation ketosis/alcoholic ketosis    Plan  Start continuous IVF  Trend lactic and bmp  TSH deficiency  TSH 0.249  No prior TSH levels. No history of hyperthyroid    Plan  Check T4 and T3    Abdominal pain  Presented to ED for c/o myalgias, abdominal pain, nausea, vomiting and inability to tolerate PO intake   Lipase normal  UA neg  CT AP: Hepatic steatosis.     Plan  PRN Zofran  PRN Maalox    Disposition: Stepdown Level 1    History of Present Illness   Corbin Baldwin is a 34 y.o. male with past medical history significant for alcohol abuse who presented to the emergency department with nausea, vomiting, and abdominal pain.  Patient is a poor historian, however he states that he has been having the symptoms for over a year with acute worsening over the past several weeks as he has been drinking more than usual.  Patient states that he typically drinks about 4 beers/day or approximately 2 bottles of wine per day but over the last several weeks he has been drinking up to 8 beers per day.  States that he will intermittently have small amounts of bright red blood tinged streaks within his vomitus but this has not been occurring recently.  He states that at times his bowel movements appear  dark/black but this is only when he drinks wine.  He has intermittent abdominal pain in both right and left lower quadrants but denies any current pain.  He states that he has not been eating and drinking as he has had decreased appetite over the past several weeks.  Laboratory workup in the emergency department significant for anion gap acidosis with lactic of 5.  CT abdomen pelvis without any acute findings but showed hepatic steatosis.  He was given 10 mg of Valium and bolused with phenobarbital in the emergency department due to concern for alcohol withdrawal.  He will be admitted stepdown 1 under critical care for further management of acute alcohol withdrawal.    Patient states that he has been participating in outpatient treatment for alcohol abuse, however he has continued drinking and has been dishonest with his therapist.  I explained that his CT showed hepatic steatosis and if he continued drinking this would likely develop into cirrhosis.  Patient states that he is participated in inpatient alcohol rehab and was sober for 1 month, however quickly relapsed due to depression.  He is interested in pursuing inpatient alcohol rehab again, however states that he is hesitant due to financial constraints.    History obtained from chart review and the patient.  Review of Systems: See HPI for Review of Systems    Historical Information   No past medical history on file. No past surgical history on file.   Current Outpatient Medications   Medication Instructions    ibuprofen (MOTRIN) 600 mg, Oral, Every 6 hours PRN    No Known Allergies   Social History     Tobacco Use    Smoking status: Every Day     Types: Cigarettes    Smokeless tobacco: Never   Substance Use Topics    Alcohol use: Yes     Alcohol/week: 6.0 standard drinks of alcohol     Types: 6 Cans of beer per week     Comment: 6 pack daily    Drug use: No    History reviewed. No pertinent family history.       Objective                          Vitals I/O       Most Recent Min/Max in 24hrs   Temp 97.6 °F (36.4 °C) Temp  Min: 97.6 °F (36.4 °C)  Max: 97.6 °F (36.4 °C)   Pulse 83 Pulse  Min: 46  Max: 90   Resp 20 Resp  Min: 19  Max: 24   /74 BP  Min: 125/74  Max: 219/97   O2 Sat 95 % SpO2  Min: 95 %  Max: 99 %      Intake/Output Summary (Last 24 hours) at 9/20/2024 1856  Last data filed at 9/20/2024 1835  Gross per 24 hour   Intake 1250 ml   Output --   Net 1250 ml       Diet NPO    Invasive Monitoring           Physical Exam   Physical Exam  Vitals reviewed.   Eyes:      Extraocular Movements: Extraocular movements intact.      Pupils: Pupils are equal, round, and reactive to light.   Skin:     General: Skin is warm and dry.      Capillary Refill: Capillary refill takes 2 to 3 seconds.      Coloration: Skin is not jaundiced or pale.   HENT:      Head: Normocephalic and atraumatic.      Mouth/Throat:      Mouth: Mucous membranes are dry.   Cardiovascular:      Rate and Rhythm: Normal rate and regular rhythm.      Heart sounds: No murmur heard.     No friction rub. No gallop.   Musculoskeletal:      Right lower leg: No edema.      Left lower leg: No edema.   Abdominal: General: There is no distension.      Palpations: Abdomen is soft.      Tenderness: There is no abdominal tenderness.   Constitutional:       General: He is not in acute distress.     Appearance: He is well-developed and well-nourished. He is not ill-appearing.   Pulmonary:      Effort: Pulmonary effort is normal.      Breath sounds: Normal breath sounds.   Neurological:      General: No focal deficit present.      Mental Status: He is alert and oriented to person, place and time. Mental status is at baseline.      Motor: Strength full and intact in all extremities.      Comments: Mild tremors in bilateral upper extremities          Diagnostic Studies        Lab Results: I have reviewed the following results:   Results Reviewed       Procedure Component Value Units Date/Time    COVID/FLU/RSV  [[id number]]     Lab Status: No result Specimen: Nares from Nose     Ethanol [[id number]]     Lab Status: No result Specimen: Blood     Salicylate level [[id number]]     Lab Status: No result Specimen: Blood     Acetaminophen level-If concentration is detectable, please discuss with medical  on call. [[id number]]     Lab Status: No result Specimen: Blood     Beta Hydroxybutyrate [[id number]]  (Normal) Collected: 09/20/24 1723    Lab Status: Final result Specimen: Blood from Arm, Right Updated: 09/20/24 1839     Beta- Hydroxybutyrate <0.05 mmol/L     Urine Microscopic [[id number]]  (Abnormal) Collected: 09/20/24 1810    Lab Status: Final result Specimen: Urine, Clean Catch Updated: 09/20/24 1838     RBC, UA None Seen /hpf      WBC, UA 0-1 /hpf      Epithelial Cells Occasional /hpf      Bacteria, UA Occasional /hpf      MUCUS THREADS Occasional    POCT Blood Gas (CG8+) [[id number]]  (Abnormal) Collected: 09/20/24 1820    Lab Status: Final result Specimen: Venous Updated: 09/20/24 1822     ph, Fitz ISTAT 7.403     pCO2, Fitz i-STAT 41.3 mm HG      pO2, Fitz i-STAT 42.0 mm HG      BE, i-STAT 1 mmol/L      HCO3, Fitz i-STAT 25.7 mmol/L      CO2, i-STAT 27 mmol/L      O2 Sat, i-STAT 78 %      SODIUM, I-STAT 137 mmol/l      Potassium, i-STAT 3.9 mmol/L      Calcium, Ionized i-STAT 1.11 mmol/L      Hct, i-STAT 38 %      Hgb, i-STAT 12.9 g/dl      Glucose, i-STAT 112 mg/dl      Specimen Type VENOUS    UA w Reflex to Microscopic w Reflex to Culture [[id number]]  (Abnormal) Collected: 09/20/24 1810    Lab Status: Final result Specimen: Urine, Clean Catch Updated: 09/20/24 1817     Color, UA Light Yellow     Clarity, UA Clear     Specific Gravity, UA <1.005     pH, UA 8.5     Leukocytes, UA Negative     Nitrite, UA Negative     Protein, UA Trace mg/dl      Glucose, UA 30 (3/100%) mg/dl      Ketones, UA Negative mg/dl      Urobilinogen, UA <2.0 mg/dl      Bilirubin, UA Negative     Occult Blood, UA Negative    Lactic acid,  plasma (w/reflex if result > 2.0) [[id number]]  (Abnormal) Collected: 09/20/24 1723    Lab Status: Final result Specimen: Blood from Arm, Right Updated: 09/20/24 1804     LACTIC ACID 5.0 mmol/L     Narrative:      Result may be elevated if tourniquet was used during collection.    Lactic acid 2 Hours [[id number]]     Lab Status: No result Specimen: Blood     Ethanol [[id number]]  (Normal) Collected: 09/20/24 1723    Lab Status: Final result Specimen: Blood from Arm, Right Updated: 09/20/24 1755     Ethanol Lvl <10 mg/dL     T3 [[id number]] Collected: 09/20/24 1516    Lab Status: In process Specimen: Blood from Arm, Right Updated: 09/20/24 1657    TSH, 3rd generation with Free T4 reflex [[id number]]  (Abnormal) Collected: 09/20/24 1516    Lab Status: Final result Specimen: Blood from Arm, Right Updated: 09/20/24 1556     TSH 3RD GENERATON 0.249 uIU/mL     T4, free [[id number]] Collected: 09/20/24 1516    Lab Status: In process Specimen: Blood from Arm, Right Updated: 09/20/24 1556    HS Troponin 0hr (reflex protocol) [[id number]]  (Normal) Collected: 09/20/24 1516    Lab Status: Final result Specimen: Blood from Arm, Right Updated: 09/20/24 1548     hs TnI 0hr <2 ng/L     Comprehensive metabolic panel [[id number]]  (Abnormal) Collected: 09/20/24 1516    Lab Status: Final result Specimen: Blood from Arm, Right Updated: 09/20/24 1547     Sodium 139 mmol/L      Potassium 3.7 mmol/L      Chloride 99 mmol/L      CO2 16 mmol/L      ANION GAP 24 mmol/L      BUN 9 mg/dL      Creatinine 0.94 mg/dL      Glucose 171 mg/dL      Calcium 9.0 mg/dL      AST 52 U/L      ALT 39 U/L      Alkaline Phosphatase 66 U/L      Total Protein 7.9 g/dL      Albumin 4.6 g/dL      Total Bilirubin 0.81 mg/dL      eGFR --    Narrative:      Notes:     1. eGFR calculation is only valid for adults 18 years and older.  2. EGFR calculation cannot be performed for patients who are transgender, non-binary, or whose legal sex, sex at birth, and gender identity  differ.    Magnesium [[id number]]  (Abnormal) Collected: 09/20/24 1516    Lab Status: Final result Specimen: Blood from Arm, Right Updated: 09/20/24 1547     Magnesium 1.4 mg/dL     Lipase [[id number]]  (Normal) Collected: 09/20/24 1516    Lab Status: Final result Specimen: Blood from Arm, Right Updated: 09/20/24 1547     Lipase 34 u/L     Protime-INR [[id number]]  (Normal) Collected: 09/20/24 1520    Lab Status: Final result Specimen: Blood from Arm, Right Updated: 09/20/24 1539     Protime 14.1 seconds      INR 1.04    Narrative:      INR Therapeutic Range    Indication                                             INR Range      Atrial Fibrillation                                               2.0-3.0  Hypercoagulable State                                    2.0.2.3  Left Ventricular Asist Device                            2.0-3.0  Mechanical Heart Valve                                  -    Aortic(with afib, MI, embolism, HF, LA enlargement,    and/or coagulopathy)                                     2.0-3.0 (2.5-3.5)     Mitral                                                             2.5-3.5  Prosthetic/Bioprosthetic Heart Valve               2.0-3.0  Venous thromboembolism (VTE: VT, PE        2.0-3.0    APTT [[id number]]  (Normal) Collected: 09/20/24 1520    Lab Status: Final result Specimen: Blood from Arm, Right Updated: 09/20/24 1539     PTT 24 seconds     CBC and differential [[id number]]  (Abnormal) Collected: 09/20/24 1516    Lab Status: Final result Specimen: Blood from Arm, Right Updated: 09/20/24 1525     WBC 4.12 Thousand/uL      RBC 3.95 Million/uL      Hemoglobin 13.6 g/dL      Hematocrit 38.4 %      MCV 97 fL      MCH 34.4 pg      MCHC 35.4 g/dL      RDW 13.0 %      MPV 8.8 fL      Platelets 167 Thousands/uL      nRBC 0 /100 WBCs      Segmented % 77 %      Immature Grans % 1 %      Lymphocytes % 10 %      Monocytes % 12 %      Eosinophils Relative 0 %      Basophils Relative 0 %      Absolute  Neutrophils 3.16 Thousands/µL      Absolute Immature Grans 0.02 Thousand/uL      Absolute Lymphocytes 0.42 Thousands/µL      Absolute Monocytes 0.50 Thousand/µL      Eosinophils Absolute 0.01 Thousand/µL      Basophils Absolute 0.01 Thousands/µL     Fingerstick Glucose (POCT) [[id number]]  (Abnormal) Collected: 09/20/24 1523    Lab Status: Final result Specimen: Blood Updated: 09/20/24 1524     POC Glucose 144 mg/dl                Medications:  Scheduled PRN   chlorhexidine, 15 mL, Q12H DEMETRI  [START ON 9/21/2024] enoxaparin, 40 mg, Daily  multi-electrolyte, 1,000 mL, Once  multivitamin-minerals, 1 tablet, Daily      ondansetron, 4 mg, Q6H PRN       Continuous    multi-electrolyte, 125 mL/hr         Labs:   CBC    Recent Labs     09/20/24  1516 09/20/24  1820   WBC 4.12*  --    HGB 13.6 12.9   HCT 38.4 38     --      BMP    Recent Labs     09/20/24  1516 09/20/24  1820   SODIUM 139  --    K 3.7  --    CL 99  --    CO2 16* 27   AGAP 24*  --    BUN 9  --    CREATININE 0.94  --    CALCIUM 9.0  --        Coags    Recent Labs     09/20/24  1520   INR 1.04   PTT 24        Additional Electrolytes  Recent Labs     09/20/24  1516 09/20/24  1820   MG 1.4*  --    CAIONIZED  --  1.11*          Blood Gas    No recent results  No recent results LFTs  Recent Labs     09/20/24  1516   ALT 39   AST 52*   ALKPHOS 66   ALB 4.6   TBILI 0.81       Infectious  No recent results  Glucose  Recent Labs     09/20/24  1516   GLUC 171*

## 2024-09-20 NOTE — ASSESSMENT & PLAN NOTE
Presented to ED for c/o myalgias, abdominal pain, nausea, vomiting and inability to tolerate PO intake   Lipase normal  UA neg  CT AP: Hepatic steatosis.     Plan  PRN Zofran  PRN Maalox

## 2024-09-20 NOTE — ASSESSMENT & PLAN NOTE
Presented to ED for c/o myalgias, abdominal pain, nausea, vomiting and inability to tolerate PO intake  pH 7.403, serum CO2 16, AG 24, LA 5.0, BHB: < 0.05  CTAP without acute findings  May be secondary to dehydration/vomiting  UA without ketones; unlikely to be starvation ketosis/alcoholic ketosis    Plan  Start continuous IVF  Trend lactic and bmp

## 2024-09-20 NOTE — ED PROVIDER NOTES
1. Alcohol withdrawal (HCC)    2. Nausea and vomiting    3. Dehydration      ED Disposition       ED Disposition   Admit    Condition   Stable    Date/Time   Fri Sep 20, 2024  6:13 PM    Comment   Case was discussed with MARQUEZ Pride and the patient's admission status was agreed to be Admission Status: inpatient status to the service of Dr. Gramajo.               Assessment & Plan       Medical Decision Making  DDx including but not limited to: Alcohol intoxication, alcohol withdrawal, dehydration, metabolic abnormality, alcoholic ketoacidosis, hyperglycemia, intracranial process, pleurisy; considered but less likely ACS or acute intra-abdominal pathology    Patient with elevated CIWA score with daily EtOH intake with most recent alcohol use at 7 AM (approximately 8 hours PTA).  Has acute alcohol withdrawal symptoms for which we will start with fluid resuscitation, to milligrams of Valium, folic acid and vitamin B supplementation, and consult to toxicology.    Amount and/or Complexity of Data Reviewed  Labs: ordered. Decision-making details documented in ED Course.  Radiology: ordered and independent interpretation performed. Decision-making details documented in ED Course.    Risk  OTC drugs.  Prescription drug management.  Decision regarding hospitalization.                ED Course as of 09/20/24 1813   Fri Sep 20, 2024   1603 CT head without contrast  IMPRESSION:     No acute intracranial abnormality.  Medial orbital wall deformity on the left is new in the interim. The lack of overlying soft tissue swelling suggests a chronic medial orbital wall fracture. Consider dedicated orbit CT if there is concern for acute injury.     1614 XR chest 2 views  IMPRESSION:     No acute cardiopulmonary disease.     1731 CT abdomen pelvis with contrast  IMPRESSION:     No acute abnormality in the abdomen or pelvis.     Hepatic steatosis.     1804 LACTIC ACID(!!): 5.0  Will give more fluid resuscitation       Medications    multivitamin-minerals (CENTRUM) tablet 1 tablet (1 tablet Oral Given 9/20/24 1557)   multi-electrolyte (PLASMALYTE-A/ISOLYTE-S PH 7.4) IV solution 1,000 mL (has no administration in time range)   chlorhexidine (PERIDEX) 0.12 % oral rinse 15 mL (has no administration in time range)   enoxaparin (LOVENOX) subcutaneous injection 40 mg (has no administration in time range)   multi-electrolyte (PLASMALYTE-A/ISOLYTE-S PH 7.4) IV solution 1,000 mL (0 mL Intravenous Stopped 9/20/24 1624)   diazepam (VALIUM) injection 10 mg (10 mg Intravenous Given 9/20/24 1515)   ondansetron (ZOFRAN) injection 4 mg (4 mg Intravenous Given 9/20/24 1520)   folic acid 1 mg, thiamine (VITAMIN B1) 100 mg in sodium chloride 0.9 % 100 mL IV piggyback ( Intravenous Stopped 9/20/24 1628)   magnesium sulfate 4 g/100 mL IVPB (premix) 4 g (4 g Intravenous New Bag 9/20/24 1558)   PHENobarbital 520 mg in sodium chloride 0.9 % 100 mL IVPB (0 mg Intravenous Stopped 9/20/24 1722)   iohexol (OMNIPAQUE) 350 MG/ML injection (MULTI-DOSE) 100 mL (100 mL Intravenous Given 9/20/24 1654)       History of Present Illness       The patient is a 34-year-old male with no known PMH presenting for evaluation of EtOH misuse and shakiness.  The patient notes drinking alcohol daily with approximately 4 large beers daily.  He notes drinking yesterday evening with last drink this morning at approximately 7 AM.  Today he is having body tremors, myalgias, nausea, and vomiting.  He has been unable to tolerate fluids and is brought in by his landlord.  He endorses a headache with some bright lights in his vision.  He also endorses right-sided chest pain and generalized abdominal pain.  He denies trauma and falls.  He denies prior EtOH withdrawal seizures.  He notes having similar symptoms of shakiness and tremors 2 days ago but he resumed drinking and they resolved.      History provided by:  Patient   used: No        Review of Systems   Constitutional:   Positive for diaphoresis.   Eyes:         Bright lights in vision   Respiratory:  Negative for shortness of breath.    Cardiovascular:  Positive for chest pain.   Gastrointestinal:  Positive for abdominal pain, nausea and vomiting.   Musculoskeletal:  Positive for neck pain.   Neurological:  Positive for headaches. Negative for seizures.           Objective     ED Triage Vitals   Temperature Pulse Blood Pressure Respirations SpO2 Patient Position - Orthostatic VS   09/20/24 1450 09/20/24 1450 09/20/24 1500 09/20/24 1450 09/20/24 1450 09/20/24 1500   97.6 °F (36.4 °C) (!) 46 (!) 219/97 (!) 24 99 % Lying      Temp Source Heart Rate Source BP Location FiO2 (%) Pain Score    09/20/24 1450 09/20/24 1450 09/20/24 1450 -- --    Temporal Monitor Left arm          Physical Exam  Vitals and nursing note reviewed.   Constitutional:       General: He is in acute distress (Evidencing moderate discomfort and mild distress).      Appearance: Normal appearance. He is well-developed. He is diaphoretic (Mildly diaphoretic). He is not ill-appearing or toxic-appearing.   HENT:      Head: Normocephalic and atraumatic.      Jaw: There is normal jaw occlusion.      Right Ear: Tympanic membrane, ear canal and external ear normal.      Left Ear: Tympanic membrane, ear canal and external ear normal.      Nose: Nose normal.      Mouth/Throat:      Lips: Pink.      Mouth: Mucous membranes are moist.      Pharynx: Oropharynx is clear. Uvula midline.   Eyes:      Extraocular Movements: Extraocular movements intact.      Conjunctiva/sclera: Conjunctivae normal.      Pupils: Pupils are equal, round, and reactive to light.   Cardiovascular:      Rate and Rhythm: Normal rate and regular rhythm.      Pulses:           Radial pulses are 2+ on the right side and 2+ on the left side.      Heart sounds: Normal heart sounds, S1 normal and S2 normal.   Pulmonary:      Effort: Pulmonary effort is normal. No respiratory distress.      Breath sounds: Normal  breath sounds and air entry.   Abdominal:      General: There is no distension.      Palpations: Abdomen is soft.   Musculoskeletal:         General: Normal range of motion.      Cervical back: Normal range of motion and neck supple.      Comments: WIGGINS and spontaneously, 5/5 strength throughout   Skin:     General: Skin is warm and moist.      Capillary Refill: Capillary refill takes less than 2 seconds.      Findings: No rash.          Neurological:      General: No focal deficit present.      Mental Status: He is alert and oriented to person, place, and time. Mental status is at baseline.         Labs Reviewed   COMPREHENSIVE METABOLIC PANEL - Abnormal       Result Value    Sodium 139      Potassium 3.7      Chloride 99      CO2 16 (*)     ANION GAP 24 (*)     BUN 9      Creatinine 0.94      Glucose 171 (*)     Calcium 9.0      AST 52 (*)     ALT 39      Alkaline Phosphatase 66      Total Protein 7.9      Albumin 4.6      Total Bilirubin 0.81      eGFR        Narrative:     Notes:     1. eGFR calculation is only valid for adults 18 years and older.  2. EGFR calculation cannot be performed for patients who are transgender, non-binary, or whose legal sex, sex at birth, and gender identity differ.   TSH, 3RD GENERATION WITH FREE T4 REFLEX - Abnormal    TSH 3RD GENERATON 0.249 (*)    MAGNESIUM - Abnormal    Magnesium 1.4 (*)    CBC AND DIFFERENTIAL - Abnormal    WBC 4.12 (*)     RBC 3.95      Hemoglobin 13.6      Hematocrit 38.4      MCV 97      MCH 34.4 (*)     MCHC 35.4      RDW 13.0      MPV 8.8 (*)     Platelets 167      nRBC 0      Segmented % 77 (*)     Immature Grans % 1      Lymphocytes % 10 (*)     Monocytes % 12      Eosinophils Relative 0      Basophils Relative 0      Absolute Neutrophils 3.16      Absolute Immature Grans 0.02      Absolute Lymphocytes 0.42 (*)     Absolute Monocytes 0.50      Eosinophils Absolute 0.01      Basophils Absolute 0.01     LACTIC ACID, PLASMA (W/REFLEX IF RESULT > 2.0) -  Abnormal    LACTIC ACID 5.0 (*)     Narrative:     Result may be elevated if tourniquet was used during collection.   POCT GLUCOSE - Abnormal    POC Glucose 144 (*)    LIPASE - Normal    Lipase 34     HS TROPONIN I 0HR - Normal    hs TnI 0hr <2     PROTIME-INR - Normal    Protime 14.1      INR 1.04      Narrative:     INR Therapeutic Range    Indication                                             INR Range      Atrial Fibrillation                                               2.0-3.0  Hypercoagulable State                                    2.0.2.3  Left Ventricular Asist Device                            2.0-3.0  Mechanical Heart Valve                                  -    Aortic(with afib, MI, embolism, HF, LA enlargement,    and/or coagulopathy)                                     2.0-3.0 (2.5-3.5)     Mitral                                                             2.5-3.5  Prosthetic/Bioprosthetic Heart Valve               2.0-3.0  Venous thromboembolism (VTE: VT, PE        2.0-3.0   APTT - Normal    PTT 24     MEDICAL ALCOHOL - Normal    Ethanol Lvl <10     HS TROPONIN I 2HR   T4, FREE   HS TROPONIN I 4HR   BETA HYDROXYBUTYRATE   UA W REFLEX TO MICROSCOPIC WITH REFLEX TO CULTURE   T3,TOTAL   LACTIC ACID 2 HOUR     CT abdomen pelvis with contrast   Final Interpretation by Fili Culp MD (09/20 1705)      No acute abnormality in the abdomen or pelvis.      Hepatic steatosis.         Workstation performed: [id number]         XR chest 2 views   ED Interpretation by YORDAN Vernon (09/20 1556)   No acute cardiopulmonary disease identified by me.      Final Interpretation by Francisco Javier Polk MD (09/20 1607)      No acute cardiopulmonary disease.            Workstation performed: [id number]         CT head without contrast   Final Interpretation by Brandyn Lopez MD (09/20 1600)      No acute intracranial abnormality.   Medial orbital wall deformity on the left is new in the interim. The lack of  overlying soft tissue swelling suggests a chronic medial orbital wall fracture. Consider dedicated orbit CT if there is concern for acute injury.               Workstation performed: [id number]             ECG 12 Lead Documentation Only    Date/Time: 9/20/2024 3:53 PM    Performed by: YORDAN Vernon  Authorized by: YORDAN Vernon    Indications / Diagnosis:  CP  ECG reviewed by me, the ED Provider: yes    Patient location:  ED  Previous ECG:     Previous ECG:  Compared to current    Comparison ECG info:  January 11, 2023    Similarity:  Changes noted (New shortened NC, previous T wave inversion of lead III is now upright)    Comparison to cardiac monitor: Yes    Interpretation:     Interpretation: non-specific    Rate:     ECG rate:  90    ECG rate assessment: normal    Rhythm:     Rhythm: sinus rhythm    Ectopy:     Ectopy: none    QRS:     QRS axis:  Normal    QRS intervals:  Normal  Conduction:     Conduction: normal    ST segments:     ST segments:  Normal  T waves:     T waves: normal    Comments:      Sinus rhythm with shortened NC, normal axis, normal QTc, no acute ischemic changes read by me      ED Medication and Procedure Management   Prior to Admission Medications   Prescriptions Last Dose Informant Patient Reported? Taking?   ibuprofen (MOTRIN) 600 mg tablet   No No   Sig: Take 1 tablet (600 mg total) by mouth every 6 (six) hours as needed for mild pain      Facility-Administered Medications: None     Patient's Medications   Discharge Prescriptions    No medications on file     No discharge procedures on file.     YORDAN Vernon  09/20/24 [id number]

## 2024-09-20 NOTE — Clinical Note
Case was discussed with MARQUEZ Pride and the patient's admission status was agreed to be Admission Status: inpatient status to the service of Dr. TAMEZ .

## 2024-09-20 NOTE — ASSESSMENT & PLAN NOTE
Last drink: 9/20 at 7AM  4 large beers daily  Ethanol level <10  In ED, received 10mg IV valium and 520mg Phenobarbital    Plan  CIWA  Seizure precautions  Folic acid/thiamine/Multivitamin

## 2024-09-21 ENCOUNTER — APPOINTMENT (INPATIENT)
Dept: CT IMAGING | Facility: HOSPITAL | Age: 34
DRG: 422 | End: 2024-09-21
Payer: MEDICARE

## 2024-09-21 VITALS
HEART RATE: 69 BPM | BODY MASS INDEX: 24.96 KG/M2 | TEMPERATURE: 97.6 F | RESPIRATION RATE: 19 BRPM | OXYGEN SATURATION: 99 % | DIASTOLIC BLOOD PRESSURE: 67 MMHG | WEIGHT: 140.87 LBS | SYSTOLIC BLOOD PRESSURE: 132 MMHG | HEIGHT: 63 IN

## 2024-09-21 PROBLEM — M54.2 NECK PAIN: Status: ACTIVE | Noted: 2024-09-21

## 2024-09-21 PROBLEM — E87.29 HIGH ANION GAP METABOLIC ACIDOSIS: Status: RESOLVED | Noted: 2024-09-20 | Resolved: 2024-09-21

## 2024-09-21 LAB
ALBUMIN SERPL BCG-MCNC: 3.9 G/DL (ref 3.5–5)
ALP SERPL-CCNC: 57 U/L (ref 34–104)
ALT SERPL W P-5'-P-CCNC: 29 U/L (ref 7–52)
ANION GAP SERPL CALCULATED.3IONS-SCNC: 8 MMOL/L (ref 4–13)
AST SERPL W P-5'-P-CCNC: 43 U/L (ref 5–45)
ATRIAL RATE: 90 BPM
BASOPHILS # BLD AUTO: 0.01 THOUSANDS/ΜL (ref 0–0.1)
BASOPHILS NFR BLD AUTO: 0 % (ref 0–1)
BILIRUB DIRECT SERPL-MCNC: 0.16 MG/DL (ref 0–0.2)
BILIRUB SERPL-MCNC: 1.07 MG/DL (ref 0.2–1)
BUN SERPL-MCNC: 4 MG/DL (ref 5–25)
CALCIUM SERPL-MCNC: 8.2 MG/DL (ref 8.4–10.2)
CHLORIDE SERPL-SCNC: 101 MMOL/L (ref 96–108)
CO2 SERPL-SCNC: 23 MMOL/L (ref 21–32)
CREAT SERPL-MCNC: 0.83 MG/DL (ref 0.6–1.3)
EOSINOPHIL # BLD AUTO: 0.05 THOUSAND/ΜL (ref 0–0.61)
EOSINOPHIL NFR BLD AUTO: 1 % (ref 0–6)
ERYTHROCYTE [DISTWIDTH] IN BLOOD BY AUTOMATED COUNT: 13.2 % (ref 11.6–15.1)
GLUCOSE SERPL-MCNC: 97 MG/DL (ref 65–140)
HCT VFR BLD AUTO: 35.7 % (ref 36.5–46.1)
HGB BLD-MCNC: 12.3 G/DL (ref 12–15.4)
IMM GRANULOCYTES # BLD AUTO: 0.02 THOUSAND/UL (ref 0–0.2)
IMM GRANULOCYTES NFR BLD AUTO: 0 % (ref 0–2)
LYMPHOCYTES # BLD AUTO: 1.1 THOUSANDS/ΜL (ref 0.6–4.47)
LYMPHOCYTES NFR BLD AUTO: 25 % (ref 14–44)
MAGNESIUM SERPL-MCNC: 2.3 MG/DL (ref 1.9–2.7)
MCH RBC QN AUTO: 34.5 PG (ref 26.8–34.3)
MCHC RBC AUTO-ENTMCNC: 34.5 G/DL (ref 31.4–37.4)
MCV RBC AUTO: 100 FL (ref 82–98)
MONOCYTES # BLD AUTO: 0.69 THOUSAND/ΜL (ref 0.17–1.22)
MONOCYTES NFR BLD AUTO: 15 % (ref 4–12)
NEUTROPHILS # BLD AUTO: 2.6 THOUSANDS/ΜL (ref 1.85–7.62)
NEUTS SEG NFR BLD AUTO: 59 % (ref 43–75)
NRBC BLD AUTO-RTO: 0 /100 WBCS
P AXIS: 30 DEGREES
PHOSPHATE SERPL-MCNC: 2.5 MG/DL (ref 2.7–4.5)
PLATELET # BLD AUTO: 121 THOUSANDS/UL (ref 149–390)
PMV BLD AUTO: 9.2 FL (ref 8.9–12.7)
POTASSIUM SERPL-SCNC: 3.7 MMOL/L (ref 3.5–5.3)
PR INTERVAL: 108 MS
PROT SERPL-MCNC: 6.6 G/DL (ref 6.4–8.4)
QRS AXIS: 60 DEGREES
QRSD INTERVAL: 86 MS
QT INTERVAL: 356 MS
QTC INTERVAL: 435 MS
RBC # BLD AUTO: 3.57 MILLION/UL (ref 3.88–5.12)
SODIUM SERPL-SCNC: 132 MMOL/L (ref 135–147)
T WAVE AXIS: 31 DEGREES
T3 SERPL-MCNC: 1 NG/ML
T4 FREE SERPL-MCNC: 0.7 NG/DL (ref 0.61–1.12)
VENTRICULAR RATE: 90 BPM
WBC # BLD AUTO: 4.47 THOUSAND/UL (ref 4.31–10.16)

## 2024-09-21 PROCEDURE — 80076 HEPATIC FUNCTION PANEL: CPT | Performed by: INTERNAL MEDICINE

## 2024-09-21 PROCEDURE — 80048 BASIC METABOLIC PNL TOTAL CA: CPT | Performed by: INTERNAL MEDICINE

## 2024-09-21 PROCEDURE — 70480 CT ORBIT/EAR/FOSSA W/O DYE: CPT

## 2024-09-21 PROCEDURE — 83735 ASSAY OF MAGNESIUM: CPT | Performed by: INTERNAL MEDICINE

## 2024-09-21 PROCEDURE — 85025 COMPLETE CBC W/AUTO DIFF WBC: CPT | Performed by: INTERNAL MEDICINE

## 2024-09-21 PROCEDURE — NC001 PR NO CHARGE: Performed by: PHYSICIAN ASSISTANT

## 2024-09-21 PROCEDURE — 99238 HOSP IP/OBS DSCHRG MGMT 30/<: CPT

## 2024-09-21 PROCEDURE — 84100 ASSAY OF PHOSPHORUS: CPT | Performed by: INTERNAL MEDICINE

## 2024-09-21 PROCEDURE — 93010 ELECTROCARDIOGRAM REPORT: CPT | Performed by: INTERNAL MEDICINE

## 2024-09-21 PROCEDURE — 72125 CT NECK SPINE W/O DYE: CPT

## 2024-09-21 RX ORDER — PANTOPRAZOLE SODIUM 40 MG/1
40 TABLET, DELAYED RELEASE ORAL DAILY
Qty: 30 TABLET | Refills: 0 | Status: SHIPPED | OUTPATIENT
Start: 2024-09-21

## 2024-09-21 RX ORDER — POTASSIUM CHLORIDE 1500 MG/1
20 TABLET, EXTENDED RELEASE ORAL ONCE
Status: COMPLETED | OUTPATIENT
Start: 2024-09-21 | End: 2024-09-21

## 2024-09-21 RX ORDER — LANOLIN ALCOHOL/MO/W.PET/CERES
100 CREAM (GRAM) TOPICAL DAILY
Status: DISCONTINUED | OUTPATIENT
Start: 2024-09-21 | End: 2024-09-21 | Stop reason: HOSPADM

## 2024-09-21 RX ORDER — FOLIC ACID 1 MG/1
1 TABLET ORAL DAILY
Status: DISCONTINUED | OUTPATIENT
Start: 2024-09-21 | End: 2024-09-21 | Stop reason: HOSPADM

## 2024-09-21 RX ORDER — MAGNESIUM HYDROXIDE/ALUMINUM HYDROXICE/SIMETHICONE 120; 1200; 1200 MG/30ML; MG/30ML; MG/30ML
30 SUSPENSION ORAL EVERY 4 HOURS PRN
Qty: 30 ML | Refills: 0 | Status: SHIPPED | OUTPATIENT
Start: 2024-09-21

## 2024-09-21 RX ORDER — LANOLIN ALCOHOL/MO/W.PET/CERES
100 CREAM (GRAM) TOPICAL DAILY
Qty: 30 TABLET | Refills: 0 | Status: SHIPPED | OUTPATIENT
Start: 2024-09-22

## 2024-09-21 RX ORDER — MAGNESIUM HYDROXIDE/ALUMINUM HYDROXICE/SIMETHICONE 120; 1200; 1200 MG/30ML; MG/30ML; MG/30ML
30 SUSPENSION ORAL EVERY 4 HOURS PRN
Status: DISCONTINUED | OUTPATIENT
Start: 2024-09-21 | End: 2024-09-21 | Stop reason: HOSPADM

## 2024-09-21 RX ORDER — FOLIC ACID 1 MG/1
1 TABLET ORAL DAILY
Qty: 30 TABLET | Refills: 0 | Status: SHIPPED | OUTPATIENT
Start: 2024-09-22

## 2024-09-21 RX ADMIN — FOLIC ACID 1 MG: 1 TABLET ORAL at 07:56

## 2024-09-21 RX ADMIN — ENOXAPARIN SODIUM 40 MG: 40 INJECTION SUBCUTANEOUS at 07:57

## 2024-09-21 RX ADMIN — PANTOPRAZOLE SODIUM 40 MG: 40 INJECTION, POWDER, FOR SOLUTION INTRAVENOUS at 07:38

## 2024-09-21 RX ADMIN — POTASSIUM PHOSPHATE, MONOBASIC POTASSIUM PHOSPHATE, DIBASIC INJECTION, 12 MMOL: 236; 224 SOLUTION, CONCENTRATE INTRAVENOUS at 07:38

## 2024-09-21 RX ADMIN — MULTIPLE VITAMINS W/ MINERALS TAB 1 TABLET: TAB ORAL at 07:38

## 2024-09-21 RX ADMIN — CHLORHEXIDINE GLUCONATE 15 ML: 1.2 RINSE ORAL at 07:38

## 2024-09-21 RX ADMIN — Medication 100 MG: at 07:57

## 2024-09-21 RX ADMIN — SODIUM CHLORIDE, SODIUM GLUCONATE, SODIUM ACETATE, POTASSIUM CHLORIDE, MAGNESIUM CHLORIDE, SODIUM PHOSPHATE, DIBASIC, AND POTASSIUM PHOSPHATE 125 ML/HR: .53; .5; .37; .037; .03; .012; .00082 INJECTION, SOLUTION INTRAVENOUS at 10:31

## 2024-09-21 RX ADMIN — SODIUM CHLORIDE, SODIUM GLUCONATE, SODIUM ACETATE, POTASSIUM CHLORIDE, MAGNESIUM CHLORIDE, SODIUM PHOSPHATE, DIBASIC, AND POTASSIUM PHOSPHATE 125 ML/HR: .53; .5; .37; .037; .03; .012; .00082 INJECTION, SOLUTION INTRAVENOUS at 02:37

## 2024-09-21 RX ADMIN — ALUMINUM HYDROXIDE, MAGNESIUM HYDROXIDE, AND SIMETHICONE 30 ML: 200; 200; 20 SUSPENSION ORAL at 13:33

## 2024-09-21 RX ADMIN — POTASSIUM CHLORIDE 20 MEQ: 1500 TABLET, EXTENDED RELEASE ORAL at 07:38

## 2024-09-21 NOTE — UTILIZATION REVIEW
"Initial Clinical Review    Admission: Date/Time/Statement:   Admission Orders (From admission, onward)       Ordered        09/20/24 1812  Inpatient Admission  Once                          Orders Placed This Encounter   Procedures    Inpatient Admission     Standing Status:   Standing     Number of Occurrences:   1     Order Specific Question:   Level of Care     Answer:   Level 1 Stepdown [13]     Order Specific Question:   Estimated length of stay     Answer:   More than 2 Midnights     Order Specific Question:   Certification     Answer:   I certify that inpatient services are medically necessary for this patient for a duration of greater than two midnights. See H&P and MD Progress Notes for additional information about the patient's course of treatment.     ED Arrival Information       Expected   -    Arrival   9/20/2024 14:43    Acuity   Emergent              Means of arrival   Wheelchair    Escorted by   Self    Service   Critical Care/ICU    Admission type   Emergency              Arrival complaint   Vomiting             Chief Complaint   Patient presents with    Vomiting     Pt states \"I've been vomiting for the last 8 hours.\" Reports alcohol withdraw. Last drink this morning around 7 am. \"Drinks 4 large big beers a day\" reports hx of seizures.        Initial Presentation: 34 y.o. adult presents to the ED from home with c/o N/V/ abd pain d/t increased alcohol consumption in last several wks with small amt bright red blood streaks in vomitus, also has dark, black stools when he drinks wine (drinks beer too), last ETOH intake 9/20 @ 0700, intermittent abd pain in BLQ, poor appetite and oral intake.  PMH: AUD.  In the ED pt was bradycardic, HTN, tachypnic.  Labs - lactic acidosis, elevated anion gap, leukopenia.  Imaging - chronic medial orbital wall fracture, hepatic steatosis.  ECG - SR with short VA.  Treated with IV fluids, IV Valium IV Zofran, IV Thiamine, Folic acid, Mag, Phenobarb, PO MVI. On exam mild " tremors BUE.  Admitted to INPATIENT status to Level 1 SD for Alcohol withdrawal, high anion gap acidosis, TSH deficiency w/o h/o hyperthyroidism, abd pain - CIWA, seizure prec, folic acid, thiamine, MVI, IV fluids, trend lactic acid and BMP, Check T4, T3, PRN Zofran, Maalox, initial CIWA 5.      Date: 9/21   Day 2: TO MS level of care today:   ETOH withdrawal with acidosis, TSH Deficiency - CIWA today 2.  Pt did not require any additional withdrawal meds overnight.  On exam is A&O x 3, has full strength and NELIA.  No abd tenderness, distention, lungs normal. VS stable.  Low NA, lactic acidosis resolved.  Imaging (CT C spine, orbits, temporal bones, skull) pending.  Not interested in rehab.       ED Triage Vitals   Temperature Pulse Respirations Blood Pressure SpO2 Pain Score   09/20/24 1450 09/20/24 1450 09/20/24 1450 09/20/24 1500 09/20/24 1450 09/20/24 1901   97.6 °F (36.4 °C) (!) 46 (!) 24 (!) 219/97 99 % No Pain     Weight (last 2 days)       Date/Time Weight    09/21/24 0500 63.9 (140.87)    09/20/24 1900 62.2 (137.13)    09/20/24 1526 62.4 (137.57)            Vital Signs (last 3 days)       Date/Time Temp Pulse Resp BP MAP (mmHg) SpO2 O2 Device Patient Position - Orthostatic VS [place] Coma Scale Score CIWA-Ar Total Pain    09/21/24 0800 -- -- -- -- -- -- -- -- 15 -- 1    09/21/24 0735 98.2 °F (36.8 °C) 94 22 149/65 93 98 % None (Room air) Lying -- -- 2    09/21/24 0600 -- 82 16 138/88 107 99 % -- -- -- -- --    09/21/24 0400 97.6 °F (36.4 °C) 68 18 132/85 104 98 % None (Room air) Lying 15 2 1    09/21/24 0200 -- 70 19 127/79 99 97 % -- -- -- -- --    09/21/24 0000 -- 76 19 141/91 111 98 % -- -- 15 -- --    09/20/24 2300 98.3 °F (36.8 °C) 78 20 137/92 110 98 % None (Room air) Lying -- 2 --    09/20/24 2113 -- -- -- -- -- -- -- -- -- -- 3    09/20/24 2000 -- 82 23 157/97 120 99 % -- -- -- 5 --    09/20/24 1915 -- -- -- -- -- -- None (Room air) -- 15 -- --    09/20/24 1901 -- -- -- -- -- -- -- -- -- -- No Pain     09/20/24 1900 98.1 °F (36.7 °C) 85 21 144/97 116 99 % None (Room air) Lying -- -- --    09/20/24 1800 -- 83 20 128/74 95 95 % None (Room air) Lying -- -- --    09/20/24 1743 -- -- -- -- -- -- -- -- -- 5 --    09/20/24 1730 -- 81 19 125/74 93 97 % None (Room air) -- -- -- --    09/20/24 1700 -- 90 20 137/76 101 99 % None (Room air) -- -- -- --    09/20/24 1630 -- 86 20 132/76 98 97 % None (Room air) -- -- -- --    09/20/24 1554 -- -- -- -- -- -- -- -- 15 -- --    09/20/24 1530 -- 88 22 -- -- 96 % -- -- -- -- --    09/20/24 1500 -- -- -- 219/97 -- -- -- Lying -- -- --    09/20/24 1450 97.6 °F (36.4 °C) 46 24 -- -- 99 % None (Room air) -- -- -- --           CIWA-Ar Score       Row Name 09/21/24 0400 09/20/24 2300 09/20/24 2000       CIWA-Ar    Nausea and Vomiting 0 0 1    Tactile Disturbances 0 0 0    Tremor 1 1 2    Auditory Disturbances 0 0 0    Paroxysmal Sweats 0 0 1    Visual Disturbances 0 0 0    Anxiety 1 1 1    Headache, Fullness in Head 0 0 0    Agitation 0 0 0    Orientation and Clouding of Sensorium 0 0 0    CIWA-Ar Total 2 2 5      Row Name 09/20/24 1743             CIWA-Ar    Nausea and Vomiting 1      Tactile Disturbances 0      Tremor 2      Auditory Disturbances 0      Paroxysmal Sweats 1      Visual Disturbances 0      Anxiety 1      Headache, Fullness in Head 0      Agitation 0      Orientation and Clouding of Sensorium 0      CIWA-Ar Total 5                      Pertinent Labs/Diagnostic Test Results:   Radiology:  CT abdomen pelvis with contrast   Final Interpretation by Fili Culp MD (09/20 1705)      No acute abnormality in the abdomen or pelvis.      Hepatic steatosis.      XR chest 2 views   ED Interpretation by YORDAN Vernon (09/20 1556)   No acute cardiopulmonary disease identified by me.      Final Interpretation by Francisco Javier Polk MD (09/20 1607)      No acute cardiopulmonary disease.      CT head without contrast   Final Interpretation by Brandyn Lopez MD (09/20 1600)      No  acute intracranial abnormality.   Medial orbital wall deformity on the left is new in the interim. The lack of overlying soft tissue swelling suggests a chronic medial orbital wall fracture. Consider dedicated orbit CT if there is concern for acute injury.      CT spine cervical wo contrast    (Results Pending)         CT orbits/temporal bones/skull base wo contrast    (Results Pending)        Cardiology:  ECG 12 lead   Final Result by Gala Allison MD (09/21 1011)   Sinus rhythm with short IN   Otherwise normal ECG   When compared with ECG of 11-JAN-2023 22:00,   No significant change was found   Confirmed by Gala Allison ([id number]) on 9/21/2024 10:11:29 AM        GI:  No orders to display       Results from last 7 days   Lab Units 09/20/24 2017   SARS-COV-2  Negative     Results from last 7 days   Lab Units 09/21/24 0251 09/20/24 1820 09/20/24  1516   WBC Thousand/uL 4.47  --  4.12*   HEMOGLOBIN g/dL 12.3  --  13.6   I STAT HEMOGLOBIN g/dl  --  12.9  --    HEMATOCRIT % 35.7*  --  38.4   HEMATOCRIT, ISTAT %  --  38  --    PLATELETS Thousands/uL 121*  --  167   TOTAL NEUT ABS Thousands/µL 2.60  --  3.16         Results from last 7 days   Lab Units 09/21/24 0251 09/20/24 2017 09/20/24 1820 09/20/24  1516   SODIUM mmol/L 132* 135  --  139   POTASSIUM mmol/L 3.7 3.6  --  3.7   CHLORIDE mmol/L 101 98  --  99   CO2 mmol/L 23 23  --  16*   CO2, I-STAT mmol/L  --   --  27  --    ANION GAP mmol/L 8 14*  --  24*   BUN mg/dL 4* 5  --  9   CREATININE mg/dL 0.83 0.80  --  0.94   CALCIUM mg/dL 8.2* 8.6  --  9.0   CALCIUM, IONIZED, ISTAT mmol/L  --   --  1.11*  --    MAGNESIUM mg/dL 2.3  --   --  1.4*   PHOSPHORUS mg/dL 2.5*  --   --   --      Results from last 7 days   Lab Units 09/21/24 0251 09/20/24  1516   AST U/L 43 52*   ALT U/L 29 39   ALK PHOS U/L 57 66   TOTAL PROTEIN g/dL 6.6 7.9   ALBUMIN g/dL 3.9 4.6   TOTAL BILIRUBIN mg/dL 1.07* 0.81   BILIRUBIN DIRECT mg/dL 0.16  --      Results from last 7 days   Lab  Units 09/20/24  1523   POC GLUCOSE mg/dl 144*     Results from last 7 days   Lab Units 09/21/24  0251 09/20/24 2017 09/20/24  1516   GLUCOSE RANDOM mg/dL 97 122 171*             Beta- Hydroxybutyrate   Date Value Ref Range Status   09/20/2024 <0.05 0.02 - 0.27 mmol/L Final              Results from last 7 days   Lab Units 09/20/24  1820   PH, CARLA I-STAT  7.403*   PCO2, CARLA ISTAT mm HG 41.3*   PO2, CARLA ISTAT mm HG 42.0   HCO3, CARLA ISTAT mmol/L 25.7   I STAT BASE EXC mmol/L 1   I STAT O2 SAT % 78         Results from last 7 days   Lab Units 09/20/24  1516   HS TNI 0HR ng/L <2         Results from last 7 days   Lab Units 09/20/24  1520   PROTIME seconds 14.1   INR  1.04   PTT seconds 24     Results from last 7 days   Lab Units 09/20/24  1516   TSH 3RD GENERATON uIU/mL 0.249*         Results from last 7 days   Lab Units 09/20/24  2226 09/20/24 2017 09/20/24  1723   LACTIC ACID mmol/L 1.7 5.0* 5.0*           Results from last 7 days   Lab Units 09/20/24  1516   LIPASE u/L 34         Results from last 7 days   Lab Units 09/20/24  1810   CLARITY UA  Clear   COLOR UA  Light Yellow   SPEC GRAV UA  <1.005*   PH UA  8.5*   GLUCOSE UA mg/dl 30 (3/100%)*   KETONES UA mg/dl Negative   BLOOD UA  Negative   PROTEIN UA mg/dl Trace*   NITRITE UA  Negative   BILIRUBIN UA  Negative   UROBILINOGEN UA (BE) mg/dl <2.0   LEUKOCYTES UA  Negative   WBC UA /hpf 0-1   RBC UA /hpf None Seen   BACTERIA UA /hpf Occasional   EPITHELIAL CELLS WET PREP /hpf Occasional   MUCUS THREADS  Occasional*     Results from last 7 days   Lab Units 09/20/24 2017   INFLUENZA A PCR  Negative   INFLUENZA B PCR  Negative   RSV PCR  Negative             Results from last 7 days   Lab Units 09/20/24 2017 09/20/24  1723   ETHANOL LVL mg/dL <10 <10   ACETAMINOPHEN LVL ug/mL <10*  --    SALICYLATE LVL mg/dL <5  --      ED Treatment-Medication Administration from 09/20/2024 1443 to 09/20/2024 1856         Date/Time Order Dose Route Action     09/20/2024 1529  multi-electrolyte (PLASMALYTE-A/ISOLYTE-S PH 7.4) IV solution 1,000 mL 1,000 mL Intravenous New Bag     09/20/2024 1515 diazepam (VALIUM) injection 10 mg 10 mg Intravenous Given     09/20/2024 1520 ondansetron (ZOFRAN) injection 4 mg 4 mg Intravenous Given     09/20/2024 1557 multivitamin-minerals (CENTRUM) tablet 1 tablet 1 tablet Oral Given     09/20/2024 1558 folic acid 1 mg, thiamine (VITAMIN B1) 100 mg in sodium chloride 0.9 % 100 mL IV piggyback -- Intravenous New Bag     09/20/2024 1558 magnesium sulfate 4 g/100 mL IVPB (premix) 4 g 4 g Intravenous New Bag     09/20/2024 1621 PHENobarbital 520 mg in sodium chloride 0.9 % 100 mL IVPB 520 mg Intravenous New Bag     09/20/2024 1654 iohexol (OMNIPAQUE) 350 MG/ML injection (MULTI-DOSE) 100 mL 100 mL Intravenous Given     09/20/2024 1834 multi-electrolyte (PLASMALYTE-A/ISOLYTE-S PH 7.4) IV solution 1,000 mL 1,000 mL Intravenous New Bag            History reviewed. No pertinent past medical history.  Present on Admission:   Alcohol withdrawal (HCC)   (Resolved) High anion gap metabolic acidosis   TSH deficiency   Abdominal pain      Admitting Diagnosis: Alcohol withdrawal (HCC) [F10.939]  Dehydration [E86.0]  Vomiting [R11.10]  Nausea and vomiting [R11.2]  Age/Sex: 34 y.o. adult  Admission Orders:  Scheduled Medications:  chlorhexidine, 15 mL, Mouth/Throat, Q12H DEMETRI  enoxaparin, 40 mg, Subcutaneous, Daily  folic acid, 1 mg, Oral, Daily  multivitamin-minerals, 1 tablet, Oral, Daily  pantoprazole, 40 mg, Intravenous, Q24H DEMETRI  potassium phosphate, 12 mmol, Intravenous, Once  thiamine, 100 mg, Oral, Daily      Continuous IV Infusions:  multi-electrolyte, 125 mL/hr, Intravenous, Continuous      PRN Meds:  acetaminophen, 650 mg, Oral, Q6H PRN - x 1 9/20  ondansetron, 4 mg, Intravenous, Q6H PRN - x 1 9/20    Level 1 SD  IV K phos  PO Thiamine, folic acid, MVI  IV PPI  IV fluids  Neuro checks q 4 hr   Daily wt   CIWA   Cont pulse ox  Seizure prec  Diet clears  IP CONSULT  TO CASE MANAGEMENT    Network Utilization Review Department  ATTENTION: Please call with any questions or concerns to [phone] and carefully listen to the prompts so that you are directed to the right person. All voicemails are confidential.   For Discharge needs, contact Care Management DC Support Team at [phone] opt. [phone]  Send all requests for admission clinical reviews, approved or denied determinations and any other requests to dedicated fax number below belonging to the campus where the patient is receiving treatment. List of dedicated fax numbers for the Facilities:  FACILITY NAME UR FAX NUMBER   ADMISSION DENIALS (Administrative/Medical Necessity) [phone]   DISCHARGE SUPPORT TEAM (NETWORK) [phone]   PARENT CHILD HEALTH (Maternity/NICU/Pediatrics) [phone]   [facility] [phone]   [facility] [phone]   [facility] [phone]   [facility] [phone]   [facility] [phone]   [facility] [phone]   [facility] [phone]   [facility] [phone]   [facility] [phone]   [facility] [phone]   [facility] [phone]   [facility] [phone]

## 2024-09-21 NOTE — CASE MANAGEMENT
Case Management Assessment & Discharge Planning Note    Patient name Corbin Baldwin  Location /-01 MRN [id number]  : 1990 Date 2024       Current Admission Date: 2024  Current Admission Diagnosis:Alcohol withdrawal (HCC)   Patient Active Problem List    Diagnosis Date Noted Date Diagnosed    Neck pain 2024     TSH deficiency 2024     Abdominal pain 2024     Sprain of anterior talofibular ligament of left ankle 2021     Alcohol withdrawal (HCC) 2017       LOS (days): 1  Geometric Mean LOS (GMLOS) (days):   Days to GMLOS:     OBJECTIVE:    Risk of Unplanned Readmission Score: 8.69         Current admission status: Inpatient       Preferred Pharmacy:   [id number] - [place], [place] - [id number] [address]  Phone: [phone] Fax: [phone]    Primary Care Provider: No primary care provider on file.    Primary Insurance: [facility]  Secondary Insurance:     ASSESSMENT:  Active Health Care Proxies    There are no active Health Care Proxies on file.                 Readmission Root Cause  30 Day Readmission: No    Patient Information  Admitted from:: Home  Mental Status: Alert  During Assessment patient was accompanied by: Not accompanied during assessment  Assessment information provided by:: Patient  Primary Caregiver: Self  Support Systems: Self  County of Residence: [place]  What [facility] do you live in?: [place]  Home entry access options. Select all that apply.: Stairs  Number of steps to enter home.: 1  Type of Current Residence: [facility]  Living Arrangements: Lives Alone  Is patient a ?: No    Activities of Daily Living Prior to Admission  Functional Status: Independent  Completes ADLs independently?: Yes  Ambulates independently?: Yes  Does patient use assisted devices?: No  Does patient currently own DME?: No  Does patient have a history of Outpatient Therapy  (PT/OT)?: No  Does the patient have a history of Short-Term Rehab?: No  Does patient have a history of HHC?: No  Does patient currently have HHC?: No         Patient Information Continued  Income Source: Self-employed  Does patient have prescription coverage?: Yes  Does patient receive dialysis treatments?: No  Does patient have a history of substance abuse?: Yes  Historical substance use preference: Alcohol/ETOH  History of Withdrawal Symptoms: Denies past symptoms  Is patient currently in treatment for substance abuse?: No. Patient declined treatment information.  Does patient have a history of Mental Health Diagnosis?: No         Means of Transportation  Means of Transport to Appts:: Friends      Social Determinants of Health (SDOH)      Flowsheet Row Most Recent Value   Housing Stability    In the last 12 months, was there a time when you were not able to pay the mortgage or rent on time? N   In the past 12 months, how many times have you moved where you were living? 0   At any time in the past 12 months, were you homeless or living in a shelter (including now)? N   Transportation Needs    In the past 12 months, has lack of transportation kept you from medical appointments or from getting medications? no   In the past 12 months, has lack of transportation kept you from meetings, work, or from getting things needed for daily living? No   Food Insecurity    Within the past 12 months, you worried that your food would run out before you got the money to buy more. Never true   Within the past 12 months, the food you bought just didn't last and you didn't have money to get more. Never true   Utilities    In the past 12 months has the electric, gas, oil, or water company threatened to shut off services in your home? No            DISCHARGE DETAILS:    Discharge planning discussed with:: Pt at bedside  [place] of Choice: Yes     CM contacted family/caregiver?: No- see comments  Were Treatment Team discharge  recommendations reviewed with patient/caregiver?: Yes  Did patient/caregiver verbalize understanding of patient care needs?: Yes  Were patient/caregiver advised of the risks associated with not following Treatment Team discharge recommendations?: Yes         Requested Home Health Care         Is the patient interested in HHC at discharge?: No                                                                Additional Comments: Met with pt at bedside to review CM role and possible discharge planning needs. Pt lives alone in a garage that was converted as an apartment. Pt has been indpendent with all ADL care prior to admission. Pt reports any hx with DME, HHC Or STR. Pt stated that he actively drinks beer daily and is admitted for ETOH withdrawal. Pt reports that he was at [facility] inpt rehab last year for rehab and has a counselor through [facility] for ETOH abuse. CM offered [facility] and inpt rehab options. Pt refused inpt rehab and [facility] support at this time. Pt stated that he will stop drinking by himself.  Made pt aware of CM availability for ongoing discharge planning needs. Updated pt's RN  David.

## 2024-09-21 NOTE — PROGRESS NOTES
Progress Note - Critical Care/ICU   Name: Corbin Baldwin 34 y.o. adult I MRN: [id number]  Unit/Bed#: -01 I Date of Admission: 9/20/2024   Date of Service: 9/21/2024 I Hospital Day: 1      Assessment & Plan  Alcohol withdrawal (HCC)  Last drink: 9/20 at 7AM  4 large beers daily  Ethanol level <10  In ED, received 10mg IV valium and 520mg Phenobarbital    Plan  CIWA  Seizure precautions  Folic acid/thiamine/Multivitamin  TSH deficiency  TSH 0.249  No prior TSH levels. No history of hyperthyroid    Plan  Check T4 and T3    Abdominal pain  Presented to ED for c/o myalgias, abdominal pain, nausea, vomiting and inability to tolerate PO intake   Lipase normal  UA neg  CT AP: Hepatic steatosis.     Plan  PRN Zofran  PRN Maalox    Disposition: Stepdown Level 1    ICU Core Measures     A: Assess, Prevent, and Manage Pain Has pain been assessed? Yes  Need for changes to pain regimen? No   B: Both SAT/SAT  N/A   C: Choice of Sedation RASS Goal: 0 Alert and Calm or N/A patient not on sedation  Need for changes to sedation or analgesia regimen? No   D: Delirium CAM-ICU: Negative   E: Early Mobility  Plan for early mobility? Yes   F: Family Engagement Plan for family engagement today? Yes         Prophylaxis:  VTE VTE covered by:  enoxaparin, Subcutaneous       Stress Ulcer  covered bypantoprazole (PROTONIX) injection 40 mg [[id number]]         24 Hour Events   24hr events: No acute events overnight. Did not require any additional treatment for withdrawal symptoms.  Subjective   Review of Systems: See HPI for Review of Systems    Objective                          Vitals I/O      Most Recent Min/Max in 24hrs   Temp 98.3 °F (36.8 °C) Temp  Min: 97.6 °F (36.4 °C)  Max: 98.3 °F (36.8 °C)   Pulse 76 Pulse  Min: 46  Max: 90   Resp 19 Resp  Min: 19  Max: 24   /91 BP  Min: 125/74  Max: 219/97   O2 Sat 98 % SpO2  Min: 95 %  Max: 99 %      Intake/Output Summary (Last 24 hours) at 9/21/2024 0008  Last data filed at  9/20/2024 2245  Gross per 24 hour   Intake 2930.84 ml   Output 1200 ml   Net 1730.84 ml       Diet Clear Liquid    Invasive Monitoring           Physical Exam   Physical Exam  Vitals reviewed.   Eyes:      Extraocular Movements: Extraocular movements intact.      Pupils: Pupils are equal, round, and reactive to light.   Skin:     General: Skin is warm and dry.      Capillary Refill: Capillary refill takes 2 to 3 seconds.   HENT:      Head: Normocephalic and atraumatic.      Mouth/Throat:      Mouth: Mucous membranes are dry.   Cardiovascular:      Rate and Rhythm: Normal rate and regular rhythm.      Heart sounds: No murmur heard.     No friction rub. No gallop.   Musculoskeletal:      Right lower leg: No edema.      Left lower leg: No edema.   Abdominal: General: There is no distension.      Palpations: Abdomen is soft.      Tenderness: There is no abdominal tenderness.   Constitutional:       Appearance: He is well-developed and well-nourished.   Pulmonary:      Effort: Pulmonary effort is normal.      Breath sounds: Normal breath sounds.   Neurological:      General: No focal deficit present.      Mental Status: He is alert and oriented to person, place and time. Mental status is at baseline.      Motor: Strength full and intact in all extremities.          Diagnostic Studies        Lab Results: I have reviewed the following results:   Results Reviewed       Procedure Component Value Units Date/Time    COVID/FLU/RSV [[id number]]  (Normal) Collected: 09/20/24 2017    Lab Status: Final result Specimen: Nares from Nose Updated: 09/20/24 2118     SARS-CoV-2 Negative     INFLUENZA A PCR Negative     INFLUENZA B PCR Negative     RSV PCR Negative    Narrative:      This test has been performed using the CoV-2/Flu/RSV plus assay on the [employer] GeneXpert platform. This test has been validated by the  and verified by the performing laboratory.     This test is designed to amplify and detect the following:  nucleocapsid (N), envelope (E), and RNA-dependent RNA polymerase (RdRP) genes of the SARS-CoV-2 genome; matrix (M), basic polymerase (PB2), and acidic protein (PA) segments of the influenza A genome; matrix (M) and non-structural protein (NS) segments of the influenza B genome, and the nucleocapsid genes of RSV A and RSV B.     Positive results are indicative of the presence of Flu A, Flu B, RSV, and/or SARS-CoV-2 RNA. Positive results for SARS-CoV-2 or suspected novel influenza should be reported to state, local, or federal health departments according to local reporting requirements.      All results should be assessed in conjunction with clinical presentation and other laboratory markers for clinical management.     FOR PEDIATRIC PATIENTS - copy/paste COVID Guidelines URL to browser: [email or web address]       Salicylate level [[id number]]  (Normal) Collected: 09/20/24 2017    Lab Status: Final result Specimen: Blood from Arm, Left Updated: 09/20/24 2107     Salicylate Lvl <5 mg/dL     Narrative:      verified by repeat analysis.Unable to calculate concentration. Result is lower than the dynamic range.    Acetaminophen level-If concentration is detectable, please discuss with medical  on call. [[id number]]  (Abnormal) Collected: 09/20/24 2017    Lab Status: Final result Specimen: Blood from Arm, Left Updated: 09/20/24 2107     Acetaminophen Level <10 ug/mL     Narrative:      verified by repeat analysis.Unable to calculate concentration. Result is lower than the dynamic range.    Lactic acid 2 Hours [[id number]]  (Abnormal) Collected: 09/20/24 2017    Lab Status: Final result Specimen: Blood from Arm, Left Updated: 09/20/24 2057     LACTIC ACID 5.0 mmol/L     Narrative:      Result may be elevated if tourniquet was used during collection.    Ethanol [[id number]]  (Normal) Collected: 09/20/24 2017    Lab Status: Final result Specimen: Blood from Arm,  Left Updated: 09/20/24 2054     Ethanol Lvl <10 mg/dL     Beta Hydroxybutyrate [[id number]]  (Normal) Collected: 09/20/24 1723    Lab Status: Final result Specimen: Blood from Arm, Right Updated: 09/20/24 1839     Beta- Hydroxybutyrate <0.05 mmol/L     Urine Microscopic [[id number]]  (Abnormal) Collected: 09/20/24 1810    Lab Status: Final result Specimen: Urine, Clean Catch Updated: 09/20/24 1838     RBC, UA None Seen /hpf      WBC, UA 0-1 /hpf      Epithelial Cells Occasional /hpf      Bacteria, UA Occasional /hpf      MUCUS THREADS Occasional    POCT Blood Gas (CG8+) [[id number]]  (Abnormal) Collected: 09/20/24 1820    Lab Status: Final result Specimen: Venous Updated: 09/20/24 1822     ph, Fitz ISTAT 7.403     pCO2, Fitz i-STAT 41.3 mm HG      pO2, Fitz i-STAT 42.0 mm HG      BE, i-STAT 1 mmol/L      HCO3, Fitz i-STAT 25.7 mmol/L      CO2, i-STAT 27 mmol/L      O2 Sat, i-STAT 78 %      SODIUM, I-STAT 137 mmol/l      Potassium, i-STAT 3.9 mmol/L      Calcium, Ionized i-STAT 1.11 mmol/L      Hct, i-STAT 38 %      Hgb, i-STAT 12.9 g/dl      Glucose, i-STAT 112 mg/dl      Specimen Type VENOUS    UA w Reflex to Microscopic w Reflex to Culture [[id number]]  (Abnormal) Collected: 09/20/24 1810    Lab Status: Final result Specimen: Urine, Clean Catch Updated: 09/20/24 1817     Color, UA Light Yellow     Clarity, UA Clear     Specific Gravity, UA <1.005     pH, UA 8.5     Leukocytes, UA Negative     Nitrite, UA Negative     Protein, UA Trace mg/dl      Glucose, UA 30 (3/100%) mg/dl      Ketones, UA Negative mg/dl      Urobilinogen, UA <2.0 mg/dl      Bilirubin, UA Negative     Occult Blood, UA Negative    Lactic acid, plasma (w/reflex if result > 2.0) [[id number]]  (Abnormal) Collected: 09/20/24 1723    Lab Status: Final result Specimen: Blood from Arm, Right Updated: 09/20/24 1804     LACTIC ACID 5.0 mmol/L     Narrative:      Result may be elevated if tourniquet was used during collection.    Ethanol [[id number]]  (Normal)  Collected: 09/20/24 1723    Lab Status: Final result Specimen: Blood from Arm, Right Updated: 09/20/24 1755     Ethanol Lvl <10 mg/dL     T3 [[id number]] Collected: 09/20/24 1516    Lab Status: In process Specimen: Blood from Arm, Right Updated: 09/20/24 1657    TSH, 3rd generation with Free T4 reflex [[id number]]  (Abnormal) Collected: 09/20/24 1516    Lab Status: Final result Specimen: Blood from Arm, Right Updated: 09/20/24 1556     TSH 3RD GENERATON 0.249 uIU/mL     T4, free [[id number]] Collected: 09/20/24 1516    Lab Status: In process Specimen: Blood from Arm, Right Updated: 09/20/24 1556    HS Troponin 0hr (reflex protocol) [[id number]]  (Normal) Collected: 09/20/24 1516    Lab Status: Final result Specimen: Blood from Arm, Right Updated: 09/20/24 1548     hs TnI 0hr <2 ng/L     Comprehensive metabolic panel [[id number]]  (Abnormal) Collected: 09/20/24 1516    Lab Status: Final result Specimen: Blood from Arm, Right Updated: 09/20/24 1547     Sodium 139 mmol/L      Potassium 3.7 mmol/L      Chloride 99 mmol/L      CO2 16 mmol/L      ANION GAP 24 mmol/L      BUN 9 mg/dL      Creatinine 0.94 mg/dL      Glucose 171 mg/dL      Calcium 9.0 mg/dL      AST 52 U/L      ALT 39 U/L      Alkaline Phosphatase 66 U/L      Total Protein 7.9 g/dL      Albumin 4.6 g/dL      Total Bilirubin 0.81 mg/dL      eGFR --    Narrative:      Notes:     1. eGFR calculation is only valid for adults 18 years and older.  2. EGFR calculation cannot be performed for patients who are transgender, non-binary, or whose legal sex, sex at birth, and gender identity differ.    Magnesium [[id number]]  (Abnormal) Collected: 09/20/24 1516    Lab Status: Final result Specimen: Blood from Arm, Right Updated: 09/20/24 1547     Magnesium 1.4 mg/dL     Lipase [[id number]]  (Normal) Collected: 09/20/24 1516    Lab Status: Final result Specimen: Blood from Arm, Right Updated: 09/20/24 1547     Lipase 34 u/L     Protime-INR [[id number]]  (Normal) Collected:  09/20/24 1520    Lab Status: Final result Specimen: Blood from Arm, Right Updated: 09/20/24 1539     Protime 14.1 seconds      INR 1.04    Narrative:      INR Therapeutic Range    Indication                                             INR Range      Atrial Fibrillation                                               2.0-3.0  Hypercoagulable State                                    2.0.2.3  Left Ventricular Asist Device                            2.0-3.0  Mechanical Heart Valve                                  -    Aortic(with afib, MI, embolism, HF, LA enlargement,    and/or coagulopathy)                                     2.0-3.0 (2.5-3.5)     Mitral                                                             2.5-3.5  Prosthetic/Bioprosthetic Heart Valve               2.0-3.0  Venous thromboembolism (VTE: VT, PE        2.0-3.0    APTT [[id number]]  (Normal) Collected: 09/20/24 1520    Lab Status: Final result Specimen: Blood from Arm, Right Updated: 09/20/24 1539     PTT 24 seconds     CBC and differential [[id number]]  (Abnormal) Collected: 09/20/24 1516    Lab Status: Final result Specimen: Blood from Arm, Right Updated: 09/20/24 1525     WBC 4.12 Thousand/uL      RBC 3.95 Million/uL      Hemoglobin 13.6 g/dL      Hematocrit 38.4 %      MCV 97 fL      MCH 34.4 pg      MCHC 35.4 g/dL      RDW 13.0 %      MPV 8.8 fL      Platelets 167 Thousands/uL      nRBC 0 /100 WBCs      Segmented % 77 %      Immature Grans % 1 %      Lymphocytes % 10 %      Monocytes % 12 %      Eosinophils Relative 0 %      Basophils Relative 0 %      Absolute Neutrophils 3.16 Thousands/µL      Absolute Immature Grans 0.02 Thousand/uL      Absolute Lymphocytes 0.42 Thousands/µL      Absolute Monocytes 0.50 Thousand/µL      Eosinophils Absolute 0.01 Thousand/µL      Basophils Absolute 0.01 Thousands/µL     Fingerstick Glucose (POCT) [[id number]]  (Abnormal) Collected: 09/20/24 1523    Lab Status: Final result Specimen: Blood Updated: 09/20/24 1524      POC Glucose 144 mg/dl                Medications:  Scheduled PRN   chlorhexidine, 15 mL, Q12H DEMETRI  enoxaparin, 40 mg, Daily  multivitamin-minerals, 1 tablet, Daily  pantoprazole, 40 mg, Q24H DEMETRI      acetaminophen, 650 mg, Q6H PRN  ondansetron, 4 mg, Q6H PRN       Continuous    multi-electrolyte, 125 mL/hr, Last Rate: 125 mL/hr (09/20/24 1923)         Labs:   CBC    Recent Labs     09/20/24 1516 09/20/24  1820   WBC 4.12*  --    HGB 13.6 12.9   HCT 38.4 38     --      BMP    Recent Labs     09/20/24  1516 09/20/24  1820 09/20/24 2017   SODIUM 139  --  135   K 3.7  --  3.6   CL 99  --  98   CO2 16* 27 23   AGAP 24*  --  14*   BUN 9  --  5   CREATININE 0.94  --  0.80   CALCIUM 9.0  --  8.6       Coags    Recent Labs     09/20/24  1520   INR 1.04   PTT 24        Additional Electrolytes  Recent Labs     09/20/24  1516 09/20/24  1820   MG 1.4*  --    CAIONIZED  --  1.11*          Blood Gas    No recent results  No recent results LFTs  Recent Labs     09/20/24  1516   ALT 39   AST 52*   ALKPHOS 66   ALB 4.6   TBILI 0.81       Infectious  No recent results  Glucose  Recent Labs     09/20/24  1516 09/20/24  2017   GLUC 171* 122

## 2024-09-21 NOTE — DISCHARGE SUMMARY
Discharge Summary - Hospitalist   Name: Corbin Baldwin 34 y.o. male I MRN: [id number]  Unit/Bed#: -01 I Date of Admission: 9/20/2024   Date of Service: 9/21/2024 I Hospital Day: 1     Assessment & Plan  Alcohol withdrawal (HCC)  Last drink: 9/20 at 7AM  4 large beers daily  Ethanol level <10  In ED, received 10mg IV valium and 520mg Phenobarbital    Plan  CIWA  Seizure precautions  Folic acid/thiamine/Multivitamin  TSH deficiency  TSH 0.249  T4 and T3 normal  No prior TSH levels. No history of hyperthyroid    Plan  Repeat levels in 3 months outpatient     Abdominal pain  Presented to ED for c/o myalgias, abdominal pain, nausea, vomiting and inability to tolerate PO intake   Lipase normal  LFT wnl  UA neg  CT AP: Hepatic steatosis. Small amount of dependent hyperdensity in the stomach, likely ingested.   Pain likely 2/2 chronic daily ETOH consumption    Plan  Tolerating regular diet prior to discharge  PRN Zofran  PRN Maalox  Avoid NSAIDs    Neck pain  Patient c/o subacute neck pain and headache for the past 2 weeks  CTH: No acute intracranial abnormality. Medial orbital wall deformity on the left is new in the interim. The lack of overlying soft tissue swelling suggests a chronic medial orbital wall fracture.  CT c-spine: No acute fracture or evidence for traumatic malalignment.   CT orbits: Chronic appearing medial left orbital wall fracture is new since the prior examination from 1/11/2023. No regional periorbital soft tissue swelling identified. Orbital floors are intact bilaterally.    Plan   Ice or heat application  PRN tylenol        Workstation performed: [id number]        Medical Problems       Resolved Problems  Date Reviewed: 8/12/2021            Resolved    High anion gap metabolic acidosis 9/21/2024     Resolved by  Reva Terrazas PA-C    Hypertensive urgency 9/20/2024     Resolved by  YORDAN Leal        Discharging Physician / Practitioner: YORDAN Leal  PCP:  No primary care provider on file.  Admission Date:   Admission Orders (From admission, onward)       Ordered        09/20/24 1812  Inpatient Admission  Once                          Discharge Date: 09/21/24    Consultations During Hospital Stay:  None    Procedures Performed:   None    Significant Findings / Test Results:   Imaging:  CT Abdomin/Pelvis: Hepatic steatosis. Small amount of dependent hyperdensity in the stomach, likely ingested.  CT Head: No acute intracranial abnormality. Medial orbital wall deformity on the left is new in the interim. The lack of overlying soft tissue swelling suggests a chronic medial orbital wall fracture.  CT C-Spine: No acute fracture or evidence for traumatic malalignment.   CT Orbits: Chronic appearing medial left orbital wall fracture is new since the prior examination from 1/11/2023. No regional periorbital soft tissue swelling identified. Orbital floors are intact bilaterally.    Lab Results:  Platelets 121  TSH 0.249 (low) with normal T4 (0.70) and normal T3 (1.0)    Incidental Findings:   None     Test Results Pending at Discharge (will require follow up):   None     Outpatient Tests Requested:  CBC with platelets   TSH and T4    Complications:  None    Reason for Admission: Inability to tolerate oral intake due to nausea/vomiting/abdominal pain, metabolic acidosis, lactic acidosis, alcohol withdrawal, subacute neck pain, subacute headache    Hospital Course:   Corbin Baldwin is a 34 y.o. male patient with past medical history significant for alcohol abuse who originally presented to the hospital on 9/20/2024 with nausea, vomiting and abdominal pain for over a year with acute worsening over the past several weeks as he has been drinking more than usual. Initial lab work revealed metabolic acidosis/lactic acidosis which resolved with fluid resuscitation. CT abdomen/pelvis showed hepatic steatosis. Concern for alcohol withdrawal in ED and patient received valium and  phenobarbital.     At time of discharge, the patient no longer reports abdominal pain, nausea or vomiting and is tolerating a regular diet. He has not required benzodiazepines or barbiturates since the ED and has no evidence of acute alcohol withdrawal. Patient is not interested in alcohol detox or inpatient rehab.    The patient, initially admitted to the hospital as inpatient, was discharged earlier than expected given the following: Resolution of acidosis, resolution of abdomin pain/nausea/vomiting and patient not interested in pursuing detox or inpatient rehab.    Please see above list of diagnoses and related plan for additional information.     Condition at Discharge: stable    Discharge Day Visit / Exam:   * Please refer to separate progress note for these details *    Discussion with Family: Patient declined call to .     Discharge instructions/Information to patient and family:   See after visit summary for information provided to patient and family.      Provisions for Follow-Up Care:  See after visit summary for information related to follow-up care and any pertinent home health orders.      Mobility at time of Discharge:   Basic Mobility Inpatient Raw Score: 23  JH-HLM Goal: 7: Walk 25 feet or more  JH-HLM Achieved: 7: Walk 25 feet or more  HLM Goal achieved. Continue to encourage appropriate mobility.     Disposition:   Home    Planned Readmission: No    Discharge Medications:  See after visit summary for reconciled discharge medications provided to patient and/or family.      Administrative Statements   Discharge Statement:  I have spent a total time of 30 minutes in caring for this patient on the day of the visit/encounter.     **Please Note: This note may have been constructed using a voice recognition system**

## 2024-09-21 NOTE — DISCHARGE INSTR - AVS FIRST PAGE
"Please obtain the following blood work:  Thrombocytopenia -> CBC with Platelets in 2 weeks  Subclinical Hyperthyroidism -> TSH + T4 in 3 months    Please return to ED if you are experiencing chest pain, shortness of breath, severe abdominal pain, if you are unable to tolerate consuming food/water, seizure activity.    Many people are able to cut back on drinking on their own. But if you have been drinking several days a week for weeks in a row, do NOT try to drink less without the help of a doctor or nurse. Stopping or reducing drinking too quickly can cause something called \"alcohol withdrawal.\" This can cause symptoms and, in some cases, even lead to death.  "

## 2024-09-21 NOTE — ASSESSMENT & PLAN NOTE
Patient c/o subacute neck pain and headache for the past 2 weeks  CTH: No acute intracranial abnormality. Medial orbital wall deformity on the left is new in the interim. The lack of overlying soft tissue swelling suggests a chronic medial orbital wall fracture.  CT c-spine: No acute fracture or evidence for traumatic malalignment.   CT orbits: Chronic appearing medial left orbital wall fracture is new since the prior examination from 1/11/2023. No regional periorbital soft tissue swelling identified. Orbital floors are intact bilaterally.    Plan   Ice or heat application  PRN tylenol        Workstation performed: [id number]

## 2024-09-21 NOTE — PLAN OF CARE
Problem: PAIN - ADULT  Goal: Verbalizes/displays adequate comfort level or baseline comfort level  Description: Interventions:  - Encourage patient to monitor pain and request assistance  - Assess pain using appropriate pain scale  - Administer analgesics based on type and severity of pain and evaluate response  - Implement non-pharmacological measures as appropriate and evaluate response  - Consider cultural and social influences on pain and pain management  - Notify physician/advanced practitioner if interventions unsuccessful or patient reports new pain  Outcome: Progressing     Problem: SAFETY ADULT  Goal: Patient will remain free of falls  Description: INTERVENTIONS:  - Educate patient/family on patient safety including physical limitations  - Instruct patient to call for assistance with activity   - Consult OT/PT to assist with strengthening/mobility   - Keep Call bell within reach  - Keep bed low and locked with side rails adjusted as appropriate  - Keep care items and personal belongings within reach  - Initiate and maintain comfort rounds  - Make Fall Risk Sign visible to staff  - Offer Toileting every 2 Hours, in advance of need  - Initiate/Maintain bed/chair alarm  - Obtain necessary fall risk management equipment:   - Apply yellow socks and bracelet for high fall risk patients  - Consider moving patient to room near nurses station  Outcome: Progressing  Goal: Maintain or return to baseline ADL function  Description: INTERVENTIONS:  -  Assess patient's ability to carry out ADLs; assess patient's baseline for ADL function and identify physical deficits which impact ability to perform ADLs (bathing, care of mouth/teeth, toileting, grooming, dressing, etc.)  - Assess/evaluate cause of self-care deficits   - Assess range of motion  - Assess patient's mobility; develop plan if impaired  - Assess patient's need for assistive devices and provide as appropriate  - Encourage maximum independence but intervene  and supervise when necessary  - Involve family in performance of ADLs  - Assess for home care needs following discharge   - Consider OT consult to assist with ADL evaluation and planning for discharge  - Provide patient education as appropriate  Outcome: Progressing  Goal: Maintains/Returns to pre admission functional level  Description: INTERVENTIONS:  - Perform AM-PAC 6 Click Basic Mobility/ Daily Activity assessment daily.  - Set and communicate daily mobility goal to care team and patient/family/caregiver.   - Collaborate with rehabilitation services on mobility goals if consulted  - Perform Range of Motion 4 times a day.  - Reposition patient every 3 hours.  - Dangle patient 3 times a day  - Stand patient 3 times a day  - Ambulate patient 3 times a day  - Out of bed to chair 3 times a day   - Out of bed for meals 3 times a day  - Out of bed for toileting  - Record patient progress and toleration of activity level   Outcome: Progressing     Problem: GASTROINTESTINAL - ADULT  Goal: Minimal or absence of nausea and/or vomiting  Description: INTERVENTIONS:  - Administer IV fluids if ordered to ensure adequate hydration  - Maintain NPO status until nausea and vomiting are resolved  - Nasogastric tube if ordered  - Administer ordered antiemetic medications as needed  - Provide nonpharmacologic comfort measures as appropriate  - Advance diet as tolerated, if ordered  - Consider nutrition services referral to assist patient with adequate nutrition and appropriate food choices  Outcome: Progressing  Goal: Maintains or returns to baseline bowel function  Description: INTERVENTIONS:  - Assess bowel function  - Encourage oral fluids to ensure adequate hydration  - Administer IV fluids if ordered to ensure adequate hydration  - Administer ordered medications as needed  - Encourage mobilization and activity  - Consider nutritional services referral to assist patient with adequate nutrition and appropriate food  choices  Outcome: Progressing  Goal: Maintains adequate nutritional intake  Description: INTERVENTIONS:  - Monitor percentage of each meal consumed  - Identify factors contributing to decreased intake, treat as appropriate  - Assist with meals as needed  - Monitor I&O, weight, and lab values if indicated  - Obtain nutrition services referral as needed  Outcome: Progressing  Goal: Establish and maintain optimal ostomy function  Description: INTERVENTIONS:  - Assess bowel function  - Encourage oral fluids to ensure adequate hydration  - Administer IV fluids if ordered to ensure adequate hydration   - Administer ordered medications as needed  - Encourage mobilization and activity  - Nutrition services referral to assist patient with appropriate food choices  - Assess stoma site  - Consider wound care consult   Outcome: Progressing  Goal: Oral mucous membranes remain intact  Description: INTERVENTIONS  - Assess oral mucosa and hygiene practices  - Implement preventative oral hygiene regimen  - Implement oral medicated treatments as ordered  - Initiate Nutrition services referral as needed  Outcome: Progressing     Problem: METABOLIC, FLUID AND ELECTROLYTES - ADULT  Goal: Electrolytes maintained within normal limits  Description: INTERVENTIONS:  - Monitor labs and assess patient for signs and symptoms of electrolyte imbalances  - Administer electrolyte replacement as ordered  - Monitor response to electrolyte replacements, including repeat lab results as appropriate  - Instruct patient on fluid and nutrition as appropriate  Outcome: Progressing  Goal: Fluid balance maintained  Description: INTERVENTIONS:  - Monitor labs   - Monitor I/O and WT  - Instruct patient on fluid and nutrition as appropriate  - Assess for signs & symptoms of volume excess or deficit  Outcome: Progressing  Goal: Glucose maintained within target range  Description: INTERVENTIONS:  - Monitor Blood Glucose as ordered  - Assess for signs and symptoms  of hyperglycemia and hypoglycemia  - Administer ordered medications to maintain glucose within target range  - Assess nutritional intake and initiate nutrition service referral as needed  Outcome: Progressing     Problem: SUBSTANCE USE/ABUSE  Goal: Will have no detox symptoms and will verbalize plan for changing substance-related behavior  Description: INTERVENTIONS:  - Monitor physical status and assess for symptoms of withdrawal  - Administer medication as ordered  - Provide emotional support with 1 on 1 interaction with staff  - Encourage recovery focused program/ addiction education  - Assess for verbalization of changing behaviors related to substance abuse  - Initiate consults and referrals as appropriate (Case Management, Spiritual Care, etc.)  Outcome: Progressing  Goal: By discharge, will develop insight into their chemical dependency and sustain motivation to continue in recovery  Description: INTERVENTIONS:  - Attends all daily group sessions and scheduled AA groups  - Actively practices coping skills through participation in the therapeutic community and adherence to program rules  - Reviews and completes assignments from individual treatment plan  - Assist patient development of understanding of their personal cycle of addiction and relapse triggers  Outcome: Progressing  Goal: By discharge, patient will have ongoing treatment plan addressing chemical dependency  Description: INTERVENTIONS:  - Assist patient with resources and/or appointments for ongoing recovery based living  Outcome: Progressing

## 2024-09-21 NOTE — ASSESSMENT & PLAN NOTE
Presented to ED for c/o myalgias, abdominal pain, nausea, vomiting and inability to tolerate PO intake   Lipase normal  LFT wnl  UA neg  CT AP: Hepatic steatosis. Small amount of dependent hyperdensity in the stomach, likely ingested.   Pain likely 2/2 chronic daily ETOH consumption    Plan  Tolerating regular diet prior to discharge  PRN Zofran  PRN Maalox  Avoid NSAIDs

## 2024-09-21 NOTE — ASSESSMENT & PLAN NOTE
TSH 0.249  T4 and T3 normal  No prior TSH levels. No history of hyperthyroid    Plan  Repeat levels in 3 months outpatient

## 2024-09-23 NOTE — UTILIZATION REVIEW
NOTIFICATION OF ADMISSION DISCHARGE   This is a Notification of Discharge from [facility]. Please be advised that this patient has been discharge from our facility. Below you will find the admission and discharge date and time including the patient’s disposition.   UTILIZATION REVIEW CONTACT:  Cheyenne Ibarra  Utilization   Network Utilization Review Department  Phone: [phone] x carefully listen to the prompts. All voicemails are confidential.  Email: [email or web address]     ADMISSION INFORMATION  PRESENTATION DATE: 9/20/2024  2:51 PM  OBERVATION ADMISSION DATE: N/A  INPATIENT ADMISSION DATE: 9/20/24  6:12 PM   DISCHARGE DATE: 9/21/2024  4:56 PM   DISPOSITION:Home/Self Care    Network Utilization Review Department  ATTENTION: Please call with any questions or concerns to [phone] and carefully listen to the prompts so that you are directed to the right person. All voicemails are confidential.   For Discharge needs, contact Care Management DC Support Team at [phone] opt. [phone]  Send all requests for admission clinical reviews, approved or denied determinations and any other requests to dedicated fax number below belonging to the campus where the patient is receiving treatment. List of dedicated fax numbers for the Facilities:  FACILITY NAME UR FAX NUMBER   ADMISSION DENIALS (Administrative/Medical Necessity) [phone]   DISCHARGE SUPPORT TEAM ([facility]) [phone]   PARENT CHILD HEALTH (Maternity/NICU/Pediatrics) [phone]   [facility] [phone]   [facility] [phone]   [facility] [phone]   [facility] [phone]   [facility] [phone]   [facility] [phone]   [facility] [phone]   [facility] [phone]    [facility] [phone]   [facility] [phone]   [facility] [phone]   [facility] [phone]

## 2024-11-06 PROBLEM — M25.461 EFFUSION OF RIGHT KNEE: Status: ACTIVE | Noted: 2024-11-06

## 2024-11-06 PROBLEM — M25.561 RIGHT KNEE PAIN: Status: ACTIVE | Noted: 2024-11-06

## 2025-07-23 ENCOUNTER — APPOINTMENT (EMERGENCY)
Dept: NON INVASIVE DIAGNOSTICS | Facility: HOSPITAL | Age: 35
End: 2025-07-23
Payer: MEDICARE

## 2025-07-23 ENCOUNTER — OFFICE VISIT (OUTPATIENT)
Dept: URGENT CARE | Facility: CLINIC | Age: 35
End: 2025-07-23
Payer: MEDICARE

## 2025-07-23 ENCOUNTER — APPOINTMENT (EMERGENCY)
Dept: RADIOLOGY | Facility: HOSPITAL | Age: 35
End: 2025-07-23
Payer: MEDICARE

## 2025-07-23 ENCOUNTER — HOSPITAL ENCOUNTER (EMERGENCY)
Facility: HOSPITAL | Age: 35
Discharge: HOME | End: 2025-07-23
Attending: EMERGENCY MEDICINE
Payer: MEDICARE

## 2025-07-23 VITALS
OXYGEN SATURATION: 97 % | RESPIRATION RATE: 18 BRPM | HEART RATE: 98 BPM | TEMPERATURE: 100.5 F | DIASTOLIC BLOOD PRESSURE: 56 MMHG | SYSTOLIC BLOOD PRESSURE: 105 MMHG

## 2025-07-23 VITALS
HEART RATE: 90 BPM | RESPIRATION RATE: 18 BRPM | SYSTOLIC BLOOD PRESSURE: 114 MMHG | TEMPERATURE: 97.6 F | DIASTOLIC BLOOD PRESSURE: 72 MMHG | OXYGEN SATURATION: 100 %

## 2025-07-23 DIAGNOSIS — M25.561 PAIN AND SWELLING OF RIGHT KNEE: Primary | ICD-10-CM

## 2025-07-23 DIAGNOSIS — M25.461 PAIN AND SWELLING OF RIGHT KNEE: Primary | ICD-10-CM

## 2025-07-23 DIAGNOSIS — M25.461 SWELLING OF RIGHT KNEE: ICD-10-CM

## 2025-07-23 DIAGNOSIS — M25.561 ACUTE PAIN OF RIGHT KNEE: Primary | ICD-10-CM

## 2025-07-23 PROCEDURE — 99284 EMERGENCY DEPT VISIT MOD MDM: CPT

## 2025-07-23 PROCEDURE — 99213 OFFICE O/P EST LOW 20 MIN: CPT

## 2025-07-23 PROCEDURE — 99284 EMERGENCY DEPT VISIT MOD MDM: CPT | Performed by: EMERGENCY MEDICINE

## 2025-07-23 PROCEDURE — 93971 EXTREMITY STUDY: CPT

## 2025-07-23 PROCEDURE — 73564 X-RAY EXAM KNEE 4 OR MORE: CPT

## 2025-07-23 PROCEDURE — 93971 EXTREMITY STUDY: CPT | Performed by: STUDENT IN AN ORGANIZED HEALTH CARE EDUCATION/TRAINING PROGRAM

## 2025-07-23 RX ORDER — NAPROXEN 500 MG/1
500 TABLET ORAL 2 TIMES DAILY PRN
Qty: 30 TABLET | Refills: 0 | Status: SHIPPED | OUTPATIENT
Start: 2025-07-23

## 2025-07-23 RX ORDER — NAPROXEN 500 MG/1
500 TABLET ORAL 2 TIMES DAILY PRN
Qty: 30 TABLET | Refills: 0 | Status: SHIPPED | OUTPATIENT
Start: 2025-07-23 | End: 2025-07-23

## 2025-07-23 RX ORDER — NAPROXEN 500 MG/1
500 TABLET ORAL ONCE
Status: DISCONTINUED | OUTPATIENT
Start: 2025-07-23 | End: 2025-07-23

## 2025-07-23 RX ORDER — IBUPROFEN 100 MG/5ML
5 SUSPENSION ORAL EVERY 6 HOURS PRN
COMMUNITY
End: 2025-07-24

## 2025-07-23 RX ORDER — ACETAMINOPHEN 325 MG/1
650 TABLET ORAL ONCE
Status: COMPLETED | OUTPATIENT
Start: 2025-07-23 | End: 2025-07-23

## 2025-07-23 RX ADMIN — ACETAMINOPHEN 650 MG: 325 TABLET ORAL at 10:09

## 2025-07-23 NOTE — PROGRESS NOTES
[facility]  Name: Corbin Almonte      : 1990      MRN: [id number]  Encounter Provider: YORDAN Scott  Encounter Date: 2025   Encounter department: [facility] [place]  :  Assessment & Plan  Pain and swelling of right knee         Patient presents with swelling to the right knee without any known trauma or injury reports associated calf pain and subjective fevers.  Denies recent travel or surgery.  However has had prolonged immobilization at home due to a right foot injury thst occurred 5 days ago.   No known history of clotting .  Given the concerning symptoms and need for prompt imaging possible labs or joint aspiration patient is being referred to the emergency department for further evaluation and management.  Differential diagnosis include DVT, septic arthritis, cellulitis, Baker's cyst rupture, inflammatory arthritis, reactive arthritis or vascular pathology.    Patient Instructions  Follow up with PCP in 3-5 days.  Proceed to  ER if symptoms worsen.    If tests are performed, our office will contact you with results only if changes need to made to the care plan discussed with you at the visit. [facility]    Chief Complaint:   Chief Complaint   Patient presents with    Knee Pain     Pt states to have right knee swelling that started yesterday.     History of Present Illness   Patient presents with right knee swelling without any trauma or injury.  He reports associated calf pain and subjective fevers.  Patient reports that this has been ongoing for 2 days.  He denies recent travel or blood clotting disorders.  Patient reports that he recently has been out of work and sitting at home with his leg elevated due to a cyst in his right foot that has been causing him pain.  No overlying skin changes reported.    Knee Pain           Review of Systems   Constitutional:  Negative for chills and fever.   HENT:  Negative  for ear pain and sore throat.    Eyes:  Negative for pain and visual disturbance.   Respiratory:  Negative for cough and shortness of breath.    Cardiovascular:  Negative for chest pain and palpitations.   Gastrointestinal:  Negative for abdominal pain and vomiting.   Genitourinary:  Negative for dysuria and hematuria.   Musculoskeletal:  Positive for joint swelling. Negative for arthralgias and back pain.   Skin:  Negative for color change and rash.   Neurological:  Negative for seizures and syncope.   All other systems reviewed and are negative.    Past Medical History   Past Medical History[1]  Past Surgical History[2]  Family History[3]  he   Current Outpatient Medications   Medication Instructions    ibuprofen (MOTRIN) 100 mg/5 mL suspension 5 mg/kg, Every 6 hours PRN    naproxen (NAPROSYN) 500 mg, Oral, 2 times daily with meals   Allergies[4]     Objective   /56   Pulse 98   Temp 100.5 °F (38.1 °C)   Resp 18   SpO2 97%      Physical Exam  Constitutional:       General: He is not in acute distress.     Appearance: Normal appearance. He is normal weight. He is not ill-appearing, toxic-appearing or diaphoretic.   HENT:      Head: Normocephalic and atraumatic.      Right Ear: Tympanic membrane, ear canal and external ear normal. There is no impacted cerumen.      Left Ear: Tympanic membrane, ear canal and external ear normal. There is no impacted cerumen.      Nose: Nose normal. No congestion or rhinorrhea.      Mouth/Throat:      Mouth: Mucous membranes are moist.     Eyes:      General: No scleral icterus.        Right eye: No discharge.         Left eye: No discharge.      Extraocular Movements: Extraocular movements intact.      Conjunctiva/sclera: Conjunctivae normal.      Pupils: Pupils are equal, round, and reactive to light.       Cardiovascular:      Rate and Rhythm: Normal rate and regular rhythm.      Pulses: Normal pulses.      Heart sounds: Normal heart sounds. No murmur heard.     No  "friction rub. No gallop.   Pulmonary:      Effort: Pulmonary effort is normal.      Breath sounds: Normal breath sounds.   Abdominal:      Palpations: Abdomen is soft.     Musculoskeletal:         General: Swelling and tenderness present.      Cervical back: Normal range of motion and neck supple.      Right knee: Swelling and effusion present. Tenderness present.        Legs:       Comments: Patient reports tenderness behind right knee extending down calf. Swelling noted to right knee . No swelling to patients calf noted      Skin:     General: Skin is warm.      Capillary Refill: Capillary refill takes less than 2 seconds.     Neurological:      General: No focal deficit present.      Mental Status: He is alert and oriented to person, place, and time. Mental status is at baseline.     Psychiatric:         Mood and Affect: Mood normal.         Behavior: Behavior normal.         Thought Content: Thought content normal.         Judgment: Judgment normal.         Portions of the record may have been created with voice recognition software.  Occasional wrong word or \"sound a like\" substitutions may have occurred due to the inherent limitations of voice recognition software.  Read the chart carefully and recognize, using context, where substitutions have occurred.         [1] No past medical history on file.  [2] No past surgical history on file.  [3] No family history on file.  [4] No Known Allergies    "

## 2025-07-23 NOTE — Clinical Note
Corbin Baldwin was seen and treated in our emergency department on 7/23/2025.                Diagnosis:     Corbin  may return to work on return date.    He may return on this date: 07/28/2025         If you have any questions or concerns, please don't hesitate to call.      Shaggy Medina, DO    ______________________________           _______________          _______________  Hospital Representative                              Date                                Time

## 2025-07-23 NOTE — ED PROVIDER NOTES
Time reflects when diagnosis was documented in both MDM as applicable and the Disposition within this note       Time User Action Codes Description Comment    7/23/2025 10:04 AM Shaggy Medina Add [M25.561] Acute pain of right knee     7/23/2025 10:04 AM Shaggy Medina Add [M25.461] Swelling of right knee           ED Disposition       ED Disposition   Discharge    Condition   Stable    Date/Time   Wed Jul 23, 2025 10:04 AM    Comment   Corbin Baldwin discharge to home/self care.                   Assessment & Plan       Medical Decision Making  Obtain x-ray of right knee, venous duplex of right lower extremity  Give Tylenol for pain and continue to monitor patient for any worsening symptoms.    X-ray did not show any acute bony abnormality.  Venous duplex of right lower extremity was negative for any DVT or extravasation.  Some soft tissue edema noted.  At this time Ace wrap was applied and patient discharged home on NSAIDs and follow-up to orthopedic surgery for further evaluation management.  Close return instructions given to return to the ER for any worsening symptoms.  Patient agrees with discharge plan.  Patient well appearing at time of discharge.    Please Note: Fluency Direct voice recognition software may have been used in the creation of this document. Wrong words or sound a like substitutions may have occurred due to the inherent limitations of the voice software.         Amount and/or Complexity of Data Reviewed  Radiology: ordered and independent interpretation performed. Decision-making details documented in ED Course.    Risk  OTC drugs.  Prescription drug management.        ED Course as of 07/23/25 1014   Wed Jul 23, 2025   1000 Case discussed with vascular tech who notes patient is negative for DVT on venous duplex of right lower extremity.  There are some soft tissue swelling noted.       Medications   acetaminophen (TYLENOL) tablet 650 mg (650 mg Oral Given 7/23/25 1009)       ED  Risk Strat Scores                    No data recorded        SBIRT 20yo+      Flowsheet Row Most Recent Value   Initial Alcohol Screen: US AUDIT-C     1. How often do you have a drink containing alcohol? 0 Filed at: 07/23/2025 0852   2. How many drinks containing alcohol do you have on a typical day you are drinking?  0 Filed at: 07/23/2025 0852   3a. Male UNDER 65: How often do you have five or more drinks on one occasion? 0 Filed at: 07/23/2025 0852   3b. FEMALE Any Age, or MALE 65+: How often do you have 4 or more drinks on one occassion? 0 Filed at: 07/23/2025 0852   Audit-C Score 0 Filed at: 07/23/2025 0852   JEIMY: How many times in the past year have you...    Used an illegal drug or used a prescription medication for non-medical reasons? Never Filed at: 07/23/2025 0852                            History of Present Illness       Chief Complaint   Patient presents with    Knee Pain     Pt reports right foot pain that started about a week and a half ago. Was told to rest. Now has right knee pain and swelling. Had mri and xray of right foot. Was sent to er for vascular study        Past Medical History[1]   Past Surgical History[2]   Family History[3]   Social History[4]   E-Cigarette/Vaping      E-Cigarette/Vaping Substances      I have reviewed and agree with the history as documented.     34-year-old male presents to the ED for evaluation of right knee swelling since yesterday.  Patient works in [employer].  He is on his knees a lot.  Patient otherwise denies any falls, trauma, injuries, or any insect bites.  Patient denies any fevers or chills.  Patient denies any warmth to touch or redness to the knee.  Patient has had some pain and difficulty bending the knee.  Subsequently patient came to the ED for further evaluation.      Knee Pain  Associated symptoms: no back pain and no fever        Review of Systems   Constitutional:  Negative for chills and fever.   HENT:  Negative for ear pain and sore throat.     Eyes:  Negative for pain and visual disturbance.   Respiratory:  Negative for cough and shortness of breath.    Cardiovascular:  Negative for chest pain and palpitations.   Gastrointestinal:  Negative for abdominal pain and vomiting.   Genitourinary:  Negative for dysuria and hematuria.   Musculoskeletal:  Positive for arthralgias. Negative for back pain.   Skin:  Negative for color change and rash.   Neurological:  Negative for seizures and syncope.   All other systems reviewed and are negative.          Objective       ED Triage Vitals   Temperature Pulse Blood Pressure Respirations SpO2 Patient Position - Orthostatic VS   07/23/25 0853 07/23/25 0853 07/23/25 0853 07/23/25 0853 07/23/25 0853 07/23/25 0853   97.6 °F (36.4 °C) 90 114/72 18 100 % Sitting      Temp Source Heart Rate Source BP Location FiO2 (%) Pain Score    07/23/25 0853 07/23/25 0853 07/23/25 0853 -- 07/23/25 1009    Temporal Monitor Left arm  7      Vitals      Date and Time Temp Pulse SpO2 Resp BP Pain Score FACES Pain Rating User   07/23/25 1009 -- -- -- -- -- 7 -- EW   07/23/25 0853 97.6 °F (36.4 °C) 90 100 % 18 114/72 -- -- HR            Physical Exam  Vitals and nursing note reviewed.   Constitutional:       General: He is not in acute distress.     Appearance: He is well-developed.   HENT:      Head: Normocephalic and atraumatic.     Eyes:      Conjunctiva/sclera: Conjunctivae normal.       Cardiovascular:      Rate and Rhythm: Normal rate and regular rhythm.      Heart sounds: No murmur heard.  Pulmonary:      Effort: Pulmonary effort is normal. No respiratory distress.      Breath sounds: Normal breath sounds.   Abdominal:      Palpations: Abdomen is soft.      Tenderness: There is no abdominal tenderness.     Musculoskeletal:         General: No swelling.      Cervical back: Neck supple.      Comments: Pulses intact to bilateral lower extremity.  Moderate edema noted to right knee without any erythema or warmth to touch.  Patient has  pain to palpation of both sides of knee.  Patient has pain with flexion of right knee.     Skin:     General: Skin is warm and dry.      Capillary Refill: Capillary refill takes less than 2 seconds.     Neurological:      Mental Status: He is alert.     Psychiatric:         Mood and Affect: Mood normal.         Results Reviewed       None            XR knee 4+ views Right injury   ED Interpretation by Shaggy Medina DO (07/23 0946)   No obvious bony deformities noted.  Bilateral soft tissue edema noted.  Formal radiology reading pending.      Final Interpretation by Fili Culp MD (07/23 0933)      No acute osseous abnormality. Possible mild patella adria. Large joint effusion.         Computerized Assisted Algorithm (CAA) may have been used to analyze all applicable images.         Workstation performed: [id number]         VAS lower limb venous duplex study, unilateral/limited    (Results Pending)       Procedures    ED Medication and Procedure Management   Prior to Admission Medications   Prescriptions Last Dose Informant Patient Reported? Taking?   aluminum-magnesium hydroxide-simethicone (MAALOX) 7864-5845-494 mg/30 mL suspension   No No   Sig: Take 30 mL by mouth every 4 (four) hours as needed for indigestion or heartburn   folic acid (FOLVITE) 1 mg tablet   No No   Sig: Take 1 tablet (1 mg total) by mouth daily   pantoprazole (PROTONIX) 40 mg tablet   No No   Sig: Take 1 tablet (40 mg total) by mouth daily   thiamine 100 MG tablet   No No   Sig: Take 1 tablet (100 mg total) by mouth daily      Facility-Administered Medications: None     Patient's Medications   Discharge Prescriptions    NAPROXEN (NAPROSYN) 500 MG TABLET    Take 1 tablet (500 mg total) by mouth 2 (two) times a day as needed for moderate pain (with meals)       Start Date: 7/23/2025 End Date: --       Order Dose: 500 mg       Quantity: 30 tablet    Refills: 0     No discharge procedures on file.  ED SEPSIS DOCUMENTATION   Time reflects  when diagnosis was documented in both MDM as applicable and the Disposition within this note       Time User Action Codes Description Comment    7/23/2025 10:04 AM Shaggy Medina Add [M25.561] Acute pain of right knee     7/23/2025 10:04 AM Shaggy Medina Add [M25.461] Swelling of right knee                    [1]   Past Medical History:  Diagnosis Date    Ganglion cyst of right foot    [2] No past surgical history on file.  [3] No family history on file.  [4]   Social History  Tobacco Use    Smoking status: Never    Smokeless tobacco: Never   Substance Use Topics    Alcohol use: Yes     Alcohol/week: 6.0 standard drinks of alcohol     Types: 6 Cans of beer per week     Comment: 6 pack daily    Drug use: No        Shaggy Medina DO  07/23/25 1014

## 2025-07-24 ENCOUNTER — APPOINTMENT (OUTPATIENT)
Dept: RADIOLOGY | Facility: CLINIC | Age: 35
End: 2025-07-24
Attending: ORTHOPAEDIC SURGERY
Payer: MEDICARE

## 2025-07-24 ENCOUNTER — OFFICE VISIT (OUTPATIENT)
Age: 35
End: 2025-07-24
Payer: MEDICARE

## 2025-07-24 ENCOUNTER — OFFICE VISIT (OUTPATIENT)
Dept: OBGYN CLINIC | Facility: CLINIC | Age: 35
End: 2025-07-24
Payer: MEDICARE

## 2025-07-24 VITALS
BODY MASS INDEX: 20.96 KG/M2 | WEIGHT: 122.8 LBS | TEMPERATURE: 100.8 F | DIASTOLIC BLOOD PRESSURE: 60 MMHG | SYSTOLIC BLOOD PRESSURE: 100 MMHG | OXYGEN SATURATION: 98 % | HEART RATE: 112 BPM | HEIGHT: 64 IN

## 2025-07-24 VITALS — WEIGHT: 124 LBS | BODY MASS INDEX: 21.17 KG/M2 | HEIGHT: 64 IN

## 2025-07-24 DIAGNOSIS — F51.01 PRIMARY INSOMNIA: ICD-10-CM

## 2025-07-24 DIAGNOSIS — M71.21 POPLITEAL CYST, RIGHT: ICD-10-CM

## 2025-07-24 DIAGNOSIS — M25.461 EFFUSION OF RIGHT KNEE: ICD-10-CM

## 2025-07-24 DIAGNOSIS — R50.9 FEVER, UNSPECIFIED FEVER CAUSE: ICD-10-CM

## 2025-07-24 DIAGNOSIS — M25.561 ACUTE PAIN OF RIGHT KNEE: ICD-10-CM

## 2025-07-24 DIAGNOSIS — M25.561 ACUTE PAIN OF RIGHT KNEE: Primary | ICD-10-CM

## 2025-07-24 DIAGNOSIS — M17.11 PRIMARY OSTEOARTHRITIS OF RIGHT KNEE: ICD-10-CM

## 2025-07-24 DIAGNOSIS — F10.11 ALCOHOL ABUSE, IN REMISSION: ICD-10-CM

## 2025-07-24 PROCEDURE — 73560 X-RAY EXAM OF KNEE 1 OR 2: CPT

## 2025-07-24 PROCEDURE — 20610 DRAIN/INJ JOINT/BURSA W/O US: CPT | Performed by: ORTHOPAEDIC SURGERY

## 2025-07-24 PROCEDURE — 99204 OFFICE O/P NEW MOD 45 MIN: CPT | Performed by: INTERNAL MEDICINE

## 2025-07-24 PROCEDURE — 99204 OFFICE O/P NEW MOD 45 MIN: CPT | Performed by: ORTHOPAEDIC SURGERY

## 2025-07-24 RX ORDER — TRAZODONE HYDROCHLORIDE 50 MG/1
50 TABLET ORAL
Qty: 90 TABLET | Refills: 1 | Status: SHIPPED | OUTPATIENT
Start: 2025-07-24

## 2025-07-24 NOTE — ASSESSMENT & PLAN NOTE
Patient following with orthopedics today    Orders:    CBC and differential    Comprehensive metabolic panel    TSH, 3rd generation with Free T4 reflex    Hepatitis C antibody

## 2025-07-24 NOTE — PROGRESS NOTES
Assessment:     1. Acute pain of right knee    2. Effusion of right knee    3. Primary osteoarthritis of right knee        Plan:     Problem List Items Addressed This Visit          Musculoskeletal and Integument    Effusion of right knee    Findings consistent acute right knee pain with effusion and mild osteoarthritis.  Right knee x-ray reviewed in office. Prognosis of his condition reviewed in office. Aspirated 60 cc of yellow, cloudy fluid from knee and did not give cortisone injection due to possible infection. Send fluid out for crystals, cell count, culture-gram stain STAT. Patient states he has been running low level fever last day. If fluid comes back with infection then patient will be notified and will need to have wash out. If just Lyme, Gout, or pseudogout then patient would be referred to PCP for further treatment. Ice, elevation, avoid kneeling, crawling, squatting. Nsaids for pain. Call patient once we have results.  All patient's questions were answered to his satisfaction.  This note is created using dictation transcription.  It may contain typographical errors, grammatical errors, improperly dictated words, background noise and other errors.         Relevant Orders    Large joint arthrocentesis: R knee    C-reactive protein    Sedimentation rate, automated    Lyme Total AB W Reflex to IGM/IGG    Synovial fluid, crystal    Synovial fluid white cell count w/ diff    Body fluid culture and Gram stain    Primary osteoarthritis of right knee    Relevant Orders    Large joint arthrocentesis: R knee       Surgery/Wound/Pain    Right knee pain - Primary    Relevant Orders    XR knee 1 or 2 vw right      Subjective:     Patient ID: Corbin Baldwin is a 35 y.o. male.  Chief Complaint:  35 yr old male in for evaluation of right knee pain. Seen in ED yesterday. Referred by Dr Medina. Patient has right knee swelling and pain. He works at [employer]. He does have to kneel but typically not  for long periods. He states he was kneeling 2 days ago but uses knee pad or soft sponge and started to get pain in knee, next day increase in pain, swelling. He is walking unassisted with limp today with continued swelling, pain. No known history of lyme, gout. He did have Dr Parsons drain knee last year and evaluated for TB no other tests.   He states when he was 14 he recalls injury to knee and took 6 months to recover but no treatment.   He states he got bitten by tick 2 months ago no bulls eye.   He states he has been running low level fever.     Allergy:  Allergies[1]  Medications:  all current active meds have been reviewed  Past Medical History:  Past Medical History[2]  Past Surgical History:  Past Surgical History[3]  Family History:  Family History[4]  Social History:  Social History     Substance and Sexual Activity   Alcohol Use Yes    Alcohol/week: 6.0 standard drinks of alcohol    Types: 6 Cans of beer per week    Comment: 6 pack daily     Social History     Substance and Sexual Activity   Drug Use No     Tobacco Use History[5]  Review of Systems   Constitutional:  Negative for chills and fever.   HENT:  Negative for ear pain and sore throat.    Eyes:  Negative for pain and visual disturbance.   Respiratory:  Negative for cough and shortness of breath.    Cardiovascular:  Negative for chest pain and palpitations.   Gastrointestinal:  Negative for abdominal pain and vomiting.   Genitourinary:  Negative for dysuria and hematuria.   Musculoskeletal:  Positive for arthralgias (right knee), gait problem (Antalgic) and joint swelling (Right knee). Negative for back pain.   Skin:  Negative for color change and rash.   Neurological:  Negative for seizures and syncope.   Psychiatric/Behavioral: Negative.     All other systems reviewed and are negative.        Objective:  BP Readings from Last 1 Encounters:   07/24/25 100/60      Wt Readings from Last 1 Encounters:   07/24/25 56.2 kg (124 lb)      BMI:  "  Estimated body mass index is 21.28 kg/m² as calculated from the following:    Height as of this encounter: 5' 4\" (1.626 m).    Weight as of this encounter: 56.2 kg (124 lb).  BSA:   Estimated body surface area is 1.6 meters squared as calculated from the following:    Height as of this encounter: 5' 4\" (1.626 m).    Weight as of this encounter: 56.2 kg (124 lb).   Physical Exam  Vitals and nursing note reviewed.   Constitutional:       Appearance: Normal appearance. He is well-developed.   HENT:      Head: Normocephalic and atraumatic.      Right Ear: External ear normal.      Left Ear: External ear normal.     Eyes:      Extraocular Movements: Extraocular movements intact.      Conjunctiva/sclera: Conjunctivae normal.     Pulmonary:      Effort: Pulmonary effort is normal.     Musculoskeletal:         General: Swelling (right knee) and tenderness (right knee) present.      Cervical back: Neck supple.      Right knee: Effusion (Grade 3) present.      Instability Tests: Medial Adonay test negative and lateral Adonay test negative.     Skin:     General: Skin is warm and dry.     Neurological:      Mental Status: He is alert and oriented to person, place, and time.      Deep Tendon Reflexes: Reflexes are normal and symmetric.     Psychiatric:         Mood and Affect: Mood normal.         Behavior: Behavior normal.       Right Knee Exam     Tenderness   Right knee tenderness location: distal quad, patella, lateral joint line.    Range of Motion   Extension:  0   Flexion:  110 (pain)     Tests   Adonay:  Medial - negative Lateral - negative  Varus: negative Valgus: negative  Lachman:  Anterior - negative    Posterior - negative  Drawer:  Anterior - negative    Posterior - negative  Patellar apprehension: negative    Other   Erythema: absent  Scars: absent  Sensation: normal  Pulse: present  Swelling: moderate  Effusion: effusion (Grade 3) present    Comments:  Mild crepitation with motion of patella            I " have personally reviewed pertinent films in PACS and my interpretation is x-ray right knee mild osteoarthritis with small marginal spurring, soft tissue swelling, no calcifications.     Large joint arthrocentesis: R knee    Performed by: Susan Blancas MD  Authorized by: Susan Blancas MD    Universal Protocol:  Consent: Verbal consent obtained  Risks and benefits: risks, benefits and alternatives were discussed  Consent given by: patient  Patient understanding: patient states understanding of the procedure being performed  Site marked: the operative site was marked  Patient identity confirmed: verbally with patient  Supporting Documentation  Indications: pain     Is this a Visco injection? NoProcedure Details  Location: knee - R knee  Preparation: Patient was prepped and draped in the usual sterile fashion  Needle size: 18 G  Ultrasound guidance: no  Approach: superolateral.    Aspirate amount: 60 mL  Aspirate: cloudy and yellow  Analysis: fluid sample sent for laboratory analysis  Patient tolerance: patient tolerated the procedure well with no immediate complications  Dressing:  Sterile dressing applied    5 cc 1% lidocaine used as local anesthetic         Scribe Attestation      I,:  Devante Emery am acting as a scribe while in the presence of the attending physician.:       I,:  Susan Blancas MD personally performed the services described in this documentation    as scribed in my presence.:                   [1] No Known Allergies  [2]   Past Medical History:  Diagnosis Date    Ganglion cyst of right foot    [3] No past surgical history on file.  [4] No family history on file.  [5]   Social History  Tobacco Use   Smoking Status Never   Smokeless Tobacco Never

## 2025-07-24 NOTE — PROGRESS NOTES
Name: Corbin Baldwin      : 1990      MRN: [id number]  Encounter Provider: Dandy Foster DO  Encounter Date: 2025   Encounter department: [facility] CARE - [place]  :  Assessment & Plan  Acute pain of right knee  Patient following with orthopedics today    Orders:    CBC and differential    Comprehensive metabolic panel    TSH, 3rd generation with Free T4 reflex    Hepatitis C antibody    Primary insomnia  Prescribed trazodone as needed  Orders:    traZODone (DESYREL) 50 mg tablet; Take 1 tablet (50 mg total) by mouth daily at bedtime as needed for sleep    CBC and differential    Comprehensive metabolic panel    TSH, 3rd generation with Free T4 reflex    Hepatitis C antibody    Alcohol abuse, in remission    Orders:    Hepatitis C antibody; Future    Comprehensive metabolic panel; Future    CBC and differential    Comprehensive metabolic panel    TSH, 3rd generation with Free T4 reflex    Hepatitis C antibody    Popliteal cyst, right  Cyst    Orders:    CBC and differential    Comprehensive metabolic panel    TSH, 3rd generation with Free T4 reflex    Hepatitis C antibody    Fever, unspecified fever cause  Lab work ordered today  Orders:    TSH, 3rd generation with Free T4 reflex; Future    UA w Reflex to Microscopic w Reflex to Culture; Future    Comprehensive metabolic panel; Future    CBC and differential; Future    CBC and differential    Comprehensive metabolic panel    TSH, 3rd generation with Free T4 reflex    Hepatitis C antibody           History of Present Illness   Patient is a 35-year-old male presenting to the office to establish care and for follow-up after presenting to the ER yesterday with acute right knee pain.  Patient was evaluated with an ultrasound to rule out DVT.  No DVT was found however 3 cm cystlike object was found in the popliteal fossa.  Patient has an appointment with orthopedics today at 2:00 further evaluate the knee.  Notably patient  "has had over the last 2 days.  Patient a history of EtOH abuse, stopped drinking 2 weeks ago without detox services.  He admitted to drinking 4 or 5 24 ounce cans of beer daily prior to quitting.    -Patient went to the ED for Acute knee pain.  Elevated temperature  In office is still in severe pain.     Patients right ankle is swollen as well.   Review of Systems   Constitutional:  Positive for fever. Negative for chills.   HENT:  Negative for ear pain and sore throat.    Eyes:  Negative for pain and visual disturbance.   Respiratory:  Negative for cough and shortness of breath.    Cardiovascular:  Negative for chest pain and palpitations.   Gastrointestinal:  Negative for abdominal pain and vomiting.   Genitourinary:  Negative for dysuria and hematuria.   Musculoskeletal:  Positive for arthralgias and joint swelling. Negative for back pain.   Skin:  Negative for color change and rash.   Neurological:  Negative for seizures and syncope.   All other systems reviewed and are negative.      Objective   /60 (BP Location: Right arm, Patient Position: Sitting, Cuff Size: Standard)   Pulse (!) 112   Temp (!) 100.8 °F (38.2 °C) (Axillary)   Ht 5' 4\" (1.626 m)   Wt 55.7 kg (122 lb 12.8 oz)   SpO2 98%   BMI 21.08 kg/m²      Physical Exam  Vitals and nursing note reviewed.   Constitutional:       General: He is not in acute distress.     Appearance: He is well-developed.   HENT:      Head: Normocephalic and atraumatic.     Eyes:      Conjunctiva/sclera: Conjunctivae normal.       Cardiovascular:      Rate and Rhythm: Normal rate and regular rhythm.      Heart sounds: No murmur heard.  Pulmonary:      Effort: Pulmonary effort is normal. No respiratory distress.      Breath sounds: Normal breath sounds.   Abdominal:      Palpations: Abdomen is soft.      Tenderness: There is no abdominal tenderness.     Musculoskeletal:         General: No swelling.      Cervical back: Neck supple.     Skin:     General: Skin is " warm and dry.      Capillary Refill: Capillary refill takes less than 2 seconds.     Neurological:      Mental Status: He is alert.     Psychiatric:         Mood and Affect: Mood normal.

## 2025-07-24 NOTE — ASSESSMENT & PLAN NOTE
Findings consistent acute right knee pain with effusion and mild osteoarthritis.  Right knee x-ray reviewed in office. Prognosis of his condition reviewed in office. Aspirated 60 cc of yellow, cloudy fluid from knee and did not give cortisone injection due to possible infection. Send fluid out for crystals, cell count, culture-gram stain STAT. Patient states he has been running low level fever last day. If fluid comes back with infection then patient will be notified and will need to have wash out. If just Lyme, Gout, or pseudogout then patient would be referred to PCP for further treatment. Ice, elevation, avoid kneeling, crawling, squatting. Nsaids for pain. Call patient once we have results.  All patient's questions were answered to his satisfaction.  This note is created using dictation transcription.  It may contain typographical errors, grammatical errors, improperly dictated words, background noise and other errors.

## 2025-07-26 LAB
B BURGDOR IGG+IGM SER QL IA: NEGATIVE
CRP SERPL-MCNC: 207 MG/L (ref 0–10)
ERYTHROCYTE [SEDIMENTATION RATE] IN BLOOD BY WESTERGREN METHOD: 64 MM/HR (ref 0–15)

## 2025-07-28 LAB
APPEARANCE FLD: ABNORMAL
BACTERIA FLD CULT: NORMAL
CIL COLUMNAR LINING CELLS FLD: 0 %
COLOR FLD: YELLOW
CRYSTALS FLD MICRO: ABNORMAL
EOSINOPHIL NFR FLD MANUAL: 0 %
LYMPHOCYTES NFR FLD MANUAL: 2 %
Lab: NORMAL
MACROPHAGES NFR FLD MANUAL: 6 %
NEUTROPHILS NFR FLD MANUAL: 92 %
NUC CELL # FLD: ABNORMAL CELLS/UL (ref 0–200)
RBC # FLD AUTO: ABNORMAL /UL

## 2025-07-29 ENCOUNTER — RESULTS FOLLOW-UP (OUTPATIENT)
Dept: OBGYN CLINIC | Facility: CLINIC | Age: 35
End: 2025-07-29

## 2025-07-29 DIAGNOSIS — M10.061 ACUTE IDIOPATHIC GOUT OF RIGHT KNEE: Primary | ICD-10-CM

## 2025-07-29 RX ORDER — COLCHICINE 0.6 MG/1
0.6 TABLET ORAL DAILY
Qty: 5 TABLET | Refills: 0 | Status: SHIPPED | OUTPATIENT
Start: 2025-07-29

## 2025-08-13 ENCOUNTER — OFFICE VISIT (OUTPATIENT)
Age: 35
End: 2025-08-13
Payer: MEDICARE

## 2025-08-15 DIAGNOSIS — M10.061 ACUTE IDIOPATHIC GOUT OF RIGHT KNEE: ICD-10-CM

## 2025-08-19 RX ORDER — COLCHICINE 0.6 MG/1
0.6 TABLET ORAL DAILY
Qty: 5 TABLET | Refills: 0 | OUTPATIENT
Start: 2025-08-19

## 2025-08-21 ENCOUNTER — TELEPHONE (OUTPATIENT)
Dept: ADMINISTRATIVE | Facility: OTHER | Age: 35
End: 2025-08-21